# Patient Record
Sex: FEMALE | Race: WHITE | URBAN - METROPOLITAN AREA
[De-identification: names, ages, dates, MRNs, and addresses within clinical notes are randomized per-mention and may not be internally consistent; named-entity substitution may affect disease eponyms.]

---

## 2022-04-20 ENCOUNTER — HOSPITAL ENCOUNTER (OUTPATIENT)
Dept: PHYSICAL THERAPY | Age: 62
Setting detail: THERAPIES SERIES
Discharge: HOME | End: 2022-04-20
Payer: COMMERCIAL

## 2022-04-20 PROCEDURE — 97140 MANUAL THERAPY 1/> REGIONS: CPT

## 2022-04-20 PROCEDURE — 97161 PT EVAL LOW COMPLEX 20 MIN: CPT

## 2022-04-20 NOTE — FLOWSHEET NOTE
[facility]  [address]  Phone: [phone] Fax: [phone]    Physical Therapy Treatment Note/ Progress Report:     Date:  2022    Patient Name:  Diann Contreras    :  1960  MRN: [id number]  Restrictions/Precautions:    Medical/Treatment Diagnosis Information:  · Diagnosis: ,cervical stenosis, neck pain  · Treatment Diagnosis: cervical stenosis M48.02, neck pain I14.5  Insurance/Certification information:  PT Insurance Information: [place]  Physician Information:  Referring Practitioner: Dr Tonja Clifford of care signed (Y/N):     Date of Patient follow up with Physician:      Progress Report: [x]  Yes  []  No     Date Range for reporting period:  Beginnin2022  Ending:     Progress report due (10 Rx/or 30 days whichever is less):      Recertification due (POC duration/ or 90 days whichever is less): 2022     Visit # Insurance Allowable Auth Needed   1 [place] () []Yes    [x]No     Pain level:  6/10     SUBJECTIVE:  See eval    OBJECTIVE: See eval   Observation:    Test measurements:      RESTRICTIONS/PRECAUTIONS: ACDF C5/6 2022    Exercises/Interventions:   Therapeutic Ex (87481)  Min: 4 Sets/sec Reps Notes   AROM c/spine rotation 1 15    T- band Row/pinch      T- band lower pinch      T- band ER activation      UT stretch      Levator stretch      Isometric at wall      Quadruped w cerv retract      Front plank      Side plank      Chin tuck      Chin tuck w lift      Chin tuck w rotation                  Manual Intervention (34359)  Min: 17      Cerv mobs/manip      Thoracic mobs/manip 1 5    CT manip      Rib mobilizations 1 2    STM 1 10    DN            NMR re-education (34075)  Min:      T-spine Ext- foam roll      Chin tucks       No money      Wall Postural re-ed            Therapeutic Activity (58080)  Min:                                        Therapeutic Exercise and NMR EXR  [x] (24004) Provided verbal/tactile cueing for activities related to strengthening, flexibility, endurance, ROM  for improvements in cervical, postural, scapular, scapulothoracic and UE control with self care, reaching, carrying, lifting, house/yardwork, driving/computer work. [x] (15656) Provided verbal/tactile cueing for activities related to improving balance, coordination, kinesthetic sense, posture, motor skill, proprioception  to assist with cervical, scapular, scapulothoracic and UE control with self care, reaching, carrying, lifting, house/yardwork, driving/computer work. Therapeutic Activities:    [] (49425 or 57487) Provided verbal/tactile cueing for activities related to improving balance, coordination, kinesthetic sense, posture, motor skill, proprioception and motor activation to allow for proper function of cervical, scapular, scapulothoracic and UE control with self care, carrying, lifting, driving/computer work.      Home Exercise Program:    [x] (87788) Reviewed/Progressed HEP activities related to strengthening, flexibility, endurance, ROM of cervical, scapular, scapulothoracic and UE control with self care, reaching, carrying, lifting, house/yardwork, driving/computer work  [] (91082) Reviewed/Progressed HEP activities related to improving balance, coordination, kinesthetic sense, posture, motor skill, proprioception of cervical, scapular, scapulothoracic and UE control with self care, reaching, carrying, lifting, house/yardwork, driving/computer work      Manual Treatments:  PROM / STM / Oscillations-Mobs:  G-I, II, III, IV (PA's, Inf., Post.)  [x] (19741) Provided manual therapy to mobilize soft tissue/joints of cervical/CT, scapular GHJ and UE for the purpose of decreasing headache, modulating pain, promoting relaxation,  increasing ROM, reducing/eliminating soft tissue swelling/inflammation/restriction, improving soft tissue extensibility and allowing for proper ROM for normal function with self care, reaching, carrying, lifting, house/yardwork, driving/computer work    Modalities:      Charges:  Timed Code Treatment Minutes: 22   Total Treatment Minutes: 45     [x] EVAL (LOW) 36015 (typically 20 minutes face-to-face)  [] EVAL (MOD) 35497 (typically 30 minutes face-to-face)  [] EVAL (HIGH) 76247 (typically 45 minutes face-to-face)  [] RE-EVAL     [] XN(13508) x     [] Dry needle 1 or 2 Muscles (60977)  [] NMR (08576) x     [] Dry needle 3+ Muscles (95051)  [x] Manual (56219) x   1    [] TA (26354) x     [] Mech Traction (67325)  [] ES(attended) (65464)     [] ES (un) (81269):   [] VASO (00779)  [] Other:    If BWC Please Indicate Time In/Out  CPT Code Time in Time out                                     GOALS:  Patient stated goal: improve AROM to allow safe driving  [] Progressing: [] Met: [] Not Met: [] Adjusted    Therapist goals for Patient:   Short Term Goals: To be achieved in: 2 weeks  1. Independent in HEP and progression per patient tolerance, in order to prevent re-injury. [] Progressing: [] Met: [] Not Met: [] Adjusted  2. Patient will have a decrease in pain to facilitate improvement in movement, function, and ADLs as indicated by Functional Deficits. [] Progressing: [] Met: [] Not Met: [] Adjusted    Long Term Goals: To be achieved in: 4 weeks  1. Disability index score of 42 or better with Foto FS and less than 40% disability for NDI to assist with reaching prior level of function. [] Progressing: [] Met: [] Not Met: [] Adjusted  2. Patient will demonstrate increased AROM to [facility] of cervical/thoracic spine to allow for proper joint functioning as indicated by patients Functional Deficits. [] Progressing: [] Met: [] Not Met: [] Adjusted  3. Patient will demonstrate an increase in postural awareness and control and activation of  Deep cervical stabilizers to allow for proper functional mobility as indicated by patients Functional Deficits.    [] Progressing: [] Met: [] Not Met: [] Adjusted  4. Patient will return to lifting/reaching functional activities without increased symptoms or restriction. [] Progressing: [] Met: [] Not Met: [] Adjusted  5. Patient will report being able to sleep at least 6 hours and drive without increased symptoms or restriction. (patient specific functional goal)    [] Progressing: [] Met: [] Not Met: [] Adjusted    ASSESSMENT:  See eval    Treatment/Activity Tolerance:  [x] Patient tolerated treatment well [] Patient limited by fatique  [] Patient limited by pain  [] Patient limited by other medical complications  [] Other:     Overall Progression Towards Functional goals/ Treatment Progress Update:  [] Patient is progressing as expected towards functional goals listed. [] Progression is slowed due to complexities/Impairments listed. [] Progression has been slowed due to co-morbidities. [x] Plan just implemented, too soon to assess goals progression <30days   [] Goals require adjustment due to lack of progress  [] Patient is not progressing as expected and requires additional follow up with physician  [] Other    Prognosis for POC: [x] Good [] Fair  [] Poor    Patient requires continued skilled intervention: [x] Yes  [] No        PLAN: See eval  [] Continue per plan of care [] Alter current plan (see comments)  [x] Plan of care initiated [] Hold pending MD visit [] Discharge    Electronically signed by: Denilson Moreno PT    Note: If patient does not return for scheduled/recommended follow up visits, this note will serve as a discharge from care along with the most recent update on progress.
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
## 2022-04-22 ENCOUNTER — HOSPITAL ENCOUNTER (OUTPATIENT)
Dept: PHYSICAL THERAPY | Age: 62
Setting detail: THERAPIES SERIES
Discharge: HOME | End: 2022-04-22
Payer: COMMERCIAL

## 2022-04-22 PROCEDURE — 97140 MANUAL THERAPY 1/> REGIONS: CPT

## 2022-04-22 PROCEDURE — 97110 THERAPEUTIC EXERCISES: CPT

## 2022-04-22 NOTE — FLOWSHEET NOTE
[place] [address] [address]  Phone: [phone] Fax: [phone]    Physical Therapy Treatment Note/ Progress Report:     Date:  2022    Patient Name:  Licha Guevara    :  1960  MRN: [id number]  Restrictions/Precautions:    Medical/Treatment Diagnosis Information:  · Diagnosis: ,cervical stenosis, neck pain  · Treatment Diagnosis: cervical stenosis M48.02, neck pain M37.3  Insurance/Certification information:  PT Insurance Information: [place]  Physician Information:  Referring Practitioner: Dr Hellen Reinoso of care signed (Y/N):     Date of Patient follow up with Physician:      Progress Report: [x]  Yes  []  No     Date Range for reporting period:  Beginnin2022  Ending:     Progress report due (10 Rx/or 30 days whichever is less):      Recertification due (POC duration/ or 90 days whichever is less): 2022     Visit # Insurance Allowable Auth Needed   2 [place] () []Yes    [x]No     Pain level:  4/10     SUBJECTIVE:  Patient reports that she didn't get much sleep at all last night, feels very stiff this morning. Tries all manner of positions, ice/heat, etc with no relief.      OBJECTIVE:    Observation:    Test measurements:      RESTRICTIONS/PRECAUTIONS: ACDF C5/6 2022    Exercises/Interventions:   Therapeutic Ex (84978)  Min: 10 Sets/sec Reps Notes   AROM c/spine rotation 1 15    SL thoracic rotation stretch 10 10 each   T- band Row/pinch      T- band lower pinch      T- band ER activation      UT stretch      Levator stretch      Isometric at wall      Quadruped w cerv retract      Front plank      Side plank      Chin tuck 5 10 supine   Chin tuck w lift      Chin tuck w rotation                  Manual Intervention (34437)  Min: 30      Cerv ROM 1 5    Thoracic mobs/manip 1 5    CT manip      Rib mobilizations 1 5    STM 1 15 Cervical paraspinals, UT   DN            NMR re-education (19600)  Min:      T-spine Ext- foam roll      Chin tucks       No money      Wall Postural re-ed            Therapeutic Activity (88277)  Min:                                        Therapeutic Exercise and NMR EXR  [x] (31302) Provided verbal/tactile cueing for activities related to strengthening, flexibility, endurance, ROM  for improvements in cervical, postural, scapular, scapulothoracic and UE control with self care, reaching, carrying, lifting, house/yardwork, driving/computer work. [x] (15303) Provided verbal/tactile cueing for activities related to improving balance, coordination, kinesthetic sense, posture, motor skill, proprioception  to assist with cervical, scapular, scapulothoracic and UE control with self care, reaching, carrying, lifting, house/yardwork, driving/computer work. Therapeutic Activities:    [] (88827 or 27937) Provided verbal/tactile cueing for activities related to improving balance, coordination, kinesthetic sense, posture, motor skill, proprioception and motor activation to allow for proper function of cervical, scapular, scapulothoracic and UE control with self care, carrying, lifting, driving/computer work.      Home Exercise Program:    [x] (91734) Reviewed/Progressed HEP activities related to strengthening, flexibility, endurance, ROM of cervical, scapular, scapulothoracic and UE control with self care, reaching, carrying, lifting, house/yardwork, driving/computer work  [] (05329) Reviewed/Progressed HEP activities related to improving balance, coordination, kinesthetic sense, posture, motor skill, proprioception of cervical, scapular, scapulothoracic and UE control with self care, reaching, carrying, lifting, house/yardwork, driving/computer work      Manual Treatments:  PROM / STM / Oscillations-Mobs:  G-I, II, III, IV (PA's, Inf., Post.)  [x] (08273) Provided manual therapy to mobilize soft tissue/joints of cervical/CT, scapular GHJ and UE for the purpose of decreasing headache, modulating pain, promoting relaxation,  increasing ROM, reducing/eliminating soft tissue swelling/inflammation/restriction, improving soft tissue extensibility and allowing for proper ROM for normal function with self care, reaching, carrying, lifting, house/yardwork, driving/computer work    Modalities:      Charges:  Timed Code Treatment Minutes: 40   Total Treatment Minutes: 40     [] EVAL (LOW) 55354 (typically 20 minutes face-to-face)  [] EVAL (MOD) 24116 (typically 30 minutes face-to-face)  [] EVAL (HIGH) 95716 (typically 45 minutes face-to-face)  [] RE-EVAL     [x] WV(51102) x 1    [] Dry needle 1 or 2 Muscles (60636)  [] NMR (09089) x     [] Dry needle 3+ Muscles (45670)  [x] Manual (84455) x   2    [] TA (30202) x     [] Mech Traction (49831)  [] ES(attended) (04442)     [] ES (un) (12528):   [] VASO (06594)  [] Other:    If [facility] Please Indicate Time In/Out  CPT Code Time in Time out                                     GOALS:  Patient stated goal: improve AROM to allow safe driving  [] Progressing: [] Met: [] Not Met: [] Adjusted    Therapist goals for Patient:   Short Term Goals: To be achieved in: 2 weeks  1. Independent in HEP and progression per patient tolerance, in order to prevent re-injury. [] Progressing: [] Met: [] Not Met: [] Adjusted  2. Patient will have a decrease in pain to facilitate improvement in movement, function, and ADLs as indicated by Functional Deficits. [] Progressing: [] Met: [] Not Met: [] Adjusted    Long Term Goals: To be achieved in: 4 weeks  1. Disability index score of 42 or better with Foto FS and less than 40% disability for NDI to assist with reaching prior level of function. [] Progressing: [] Met: [] Not Met: [] Adjusted  2. Patient will demonstrate increased AROM to [facility] of cervical/thoracic spine to allow for proper joint functioning as indicated by patients Functional Deficits. [] Progressing: [] Met: [] Not Met: [] Adjusted  3.  Patient will demonstrate an increase in postural awareness and control and activation of  Deep cervical stabilizers to allow for proper functional mobility as indicated by patients Functional Deficits. [] Progressing: [] Met: [] Not Met: [] Adjusted  4. Patient will return to lifting/reaching functional activities without increased symptoms or restriction. [] Progressing: [] Met: [] Not Met: [] Adjusted  5. Patient will report being able to sleep at least 6 hours and drive without increased symptoms or restriction. (patient specific functional goal)    [] Progressing: [] Met: [] Not Met: [] Adjusted    ASSESSMENT:  Improvement in stiffness at conclusion with patient able to achieve greater cervical rotation AROM at conclusion, though still limited bilaterally. Patient extremely guarded through C-spine with significant myofascial restriction throughout. Added side lying thoracic rotation stretch to HEP. Treatment/Activity Tolerance:  [x] Patient tolerated treatment well [] Patient limited by fatique  [] Patient limited by pain  [] Patient limited by other medical complications  [] Other:     Overall Progression Towards Functional goals/ Treatment Progress Update:  [] Patient is progressing as expected towards functional goals listed. [] Progression is slowed due to complexities/Impairments listed. [] Progression has been slowed due to co-morbidities.   [x] Plan just implemented, too soon to assess goals progression <30days   [] Goals require adjustment due to lack of progress  [] Patient is not progressing as expected and requires additional follow up with physician  [] Other    Prognosis for POC: [x] Good [] Fair  [] Poor    Patient requires continued skilled intervention: [x] Yes  [] No        PLAN:   [x] Continue per plan of care [] Alter current plan (see comments)  [] Plan of care initiated [] Hold pending MD visit [] Discharge    Electronically signed by: Dao Duran PT    Note: If patient does not return for scheduled/recommended follow up visits, this note will serve as a discharge from care along with the most recent update on progress.

## 2022-04-27 ENCOUNTER — HOSPITAL ENCOUNTER (OUTPATIENT)
Dept: PHYSICAL THERAPY | Age: 62
Setting detail: THERAPIES SERIES
Discharge: HOME | End: 2022-04-27
Payer: COMMERCIAL

## 2022-04-27 PROCEDURE — 97110 THERAPEUTIC EXERCISES: CPT

## 2022-04-27 PROCEDURE — 97140 MANUAL THERAPY 1/> REGIONS: CPT

## 2022-04-27 NOTE — FLOWSHEET NOTE
[facility] [address] [facility]  Phone: [phone] Fax: [phone]    Physical Therapy Treatment Note/ Progress Report:     Date:  2022    Patient Name:  Magalys Schulz    :  1960  MRN: [id number]  Restrictions/Precautions:    Medical/Treatment Diagnosis Information:  · Diagnosis: ,cervical stenosis, neck pain  · Treatment Diagnosis: cervical stenosis M48.02, neck pain V17.9  Insurance/Certification information:  PT Insurance Information: [place]  Physician Information:  Referring Practitioner: Dr Lauren Raman of care signed (Y/N):     Date of Patient follow up with Physician:      Progress Report: [x]  Yes  []  No     Date Range for reporting period:  Beginnin2022  Ending:     Progress report due (10 Rx/or 30 days whichever is less):      Recertification due (POC duration/ or 90 days whichever is less): 2022     Visit # Insurance Allowable Auth Needed   3 [place] (yr) []Yes    [x]No     Pain level:  4/10     SUBJECTIVE:  Patient reports that she feels like motion continues to improve, but still limited.     OBJECTIVE:    Observation:    Test measurements:      RESTRICTIONS/PRECAUTIONS: ACDF C5/6 2022    Exercises/Interventions:   Therapeutic Ex (58434)  Min: 25 Sets/sec Reps Notes   AROM c/spine rotation 1 15    SL thoracic rotation stretch 10 10 each   T- band Row/pinch 1 10 green   T- band lower pinch 1 10 green   T- band ER activation      UT stretch      Levator stretch      Cervical spine isometrics  10 10 Rotation, SB, flex, ext   Quadruped w cerv retract      Front plank      Side plank      Chin tuck 5 10 supine   Chin tuck w lift      Chin tuck w rotation ball on wall 1 10    Standing shoulder elevation to 90 degrees, flexion, scaption and abd 1 15 1 lb         Manual Intervention (85992)  Min: 20      Cerv ROM 1 5    Thoracic mobs/manip 1 0    CT manip      Rib mobilizations 1 5    STM 1 10 Cervical paraspinals, UT   DN            NMR re-education (84408)  Min:      T-spine Ext- foam roll      Chin tucks       No money      Wall Postural re-ed            Therapeutic Activity (30728)  Min:                                        Therapeutic Exercise and NMR EXR  [x] (88285) Provided verbal/tactile cueing for activities related to strengthening, flexibility, endurance, ROM  for improvements in cervical, postural, scapular, scapulothoracic and UE control with self care, reaching, carrying, lifting, house/yardwork, driving/computer work. [x] (21494) Provided verbal/tactile cueing for activities related to improving balance, coordination, kinesthetic sense, posture, motor skill, proprioception  to assist with cervical, scapular, scapulothoracic and UE control with self care, reaching, carrying, lifting, house/yardwork, driving/computer work. Therapeutic Activities:    [] (10852 or 93325) Provided verbal/tactile cueing for activities related to improving balance, coordination, kinesthetic sense, posture, motor skill, proprioception and motor activation to allow for proper function of cervical, scapular, scapulothoracic and UE control with self care, carrying, lifting, driving/computer work.      Home Exercise Program:    [x] (02062) Reviewed/Progressed HEP activities related to strengthening, flexibility, endurance, ROM of cervical, scapular, scapulothoracic and UE control with self care, reaching, carrying, lifting, house/yardwork, driving/computer work  [] (77694) Reviewed/Progressed HEP activities related to improving balance, coordination, kinesthetic sense, posture, motor skill, proprioception of cervical, scapular, scapulothoracic and UE control with self care, reaching, carrying, lifting, house/yardwork, driving/computer work      Manual Treatments:  PROM / STM / Oscillations-Mobs:  G-I, II, III, IV (PA's, Inf., Post.)  [x] (44599) Provided manual therapy to mobilize soft tissue/joints of cervical/CT, scapular GHJ and UE for the purpose of decreasing headache, modulating pain, promoting relaxation,  increasing ROM, reducing/eliminating soft tissue swelling/inflammation/restriction, improving soft tissue extensibility and allowing for proper ROM for normal function with self care, reaching, carrying, lifting, house/yardwork, driving/computer work    Modalities:      Charges:  Timed Code Treatment Minutes: 45   Total Treatment Minutes: 45     [] EVAL (LOW) 63308 (typically 20 minutes face-to-face)  [] EVAL (MOD) 59716 (typically 30 minutes face-to-face)  [] EVAL (HIGH) 22411 (typically 45 minutes face-to-face)  [] RE-EVAL     [x] JZ(59547) x 2   [] Dry needle 1 or 2 Muscles (79862)  [] NMR (57713) x     [] Dry needle 3+ Muscles (94549)  [x] Manual (93425) x   1    [] TA (79126) x     [] Mech Traction (05588)  [] ES(attended) (01327)     [] ES (un) (89449):   [] VASO (17934)  [] Other:    If [facility] Please Indicate Time In/Out  CPT Code Time in Time out                                     GOALS:  Patient stated goal: improve AROM to allow safe driving  [] Progressing: [] Met: [] Not Met: [] Adjusted    Therapist goals for Patient:   Short Term Goals: To be achieved in: 2 weeks  1. Independent in HEP and progression per patient tolerance, in order to prevent re-injury. [] Progressing: [] Met: [] Not Met: [] Adjusted  2. Patient will have a decrease in pain to facilitate improvement in movement, function, and ADLs as indicated by Functional Deficits. [] Progressing: [] Met: [] Not Met: [] Adjusted    Long Term Goals: To be achieved in: 4 weeks  1. Disability index score of 42 or better with Foto FS and less than 40% disability for NDI to assist with reaching prior level of function. [] Progressing: [] Met: [] Not Met: [] Adjusted  2. Patient will demonstrate increased AROM to [facility] of cervical/thoracic spine to allow for proper joint functioning as indicated by patients Functional Deficits.   [] Progressing: [] Met: [] Not Met: [] Adjusted  3. Patient will demonstrate an increase in postural awareness and control and activation of  Deep cervical stabilizers to allow for proper functional mobility as indicated by patients Functional Deficits. [] Progressing: [] Met: [] Not Met: [] Adjusted  4. Patient will return to lifting/reaching functional activities without increased symptoms or restriction. [] Progressing: [] Met: [] Not Met: [] Adjusted  5. Patient will report being able to sleep at least 6 hours and drive without increased symptoms or restriction. (patient specific functional goal)    [] Progressing: [] Met: [] Not Met: [] Adjusted    ASSESSMENT:  Improvement in stiffness at conclusion with patient able to achieve greater cervical rotation and flexion AROM at conclusion, though still limited bilaterally. Patient extremely guarded through C-spine with significant myofascial restriction throughout. Progressed isometrics for c/spine and periscapular/shoulder strengthening. Treatment/Activity Tolerance:  [x] Patient tolerated treatment well [] Patient limited by fatique  [] Patient limited by pain  [] Patient limited by other medical complications  [] Other:     Overall Progression Towards Functional goals/ Treatment Progress Update:  [] Patient is progressing as expected towards functional goals listed. [] Progression is slowed due to complexities/Impairments listed. [] Progression has been slowed due to co-morbidities.   [x] Plan just implemented, too soon to assess goals progression <30days   [] Goals require adjustment due to lack of progress  [] Patient is not progressing as expected and requires additional follow up with physician  [] Other    Prognosis for POC: [x] Good [] Fair  [] Poor    Patient requires continued skilled intervention: [x] Yes  [] No        PLAN:   [x] Continue per plan of care [] Alter current plan (see comments)  [] Plan of care initiated [] Hold pending MD visit [] Discharge    Electronically signed by: Antoine Bender PT    Note: If patient does not return for scheduled/recommended follow up visits, this note will serve as a discharge from care along with the most recent update on progress.

## 2022-04-29 ENCOUNTER — HOSPITAL ENCOUNTER (OUTPATIENT)
Dept: PHYSICAL THERAPY | Age: 62
Setting detail: THERAPIES SERIES
Discharge: HOME | End: 2022-04-29
Payer: COMMERCIAL

## 2022-04-29 PROCEDURE — 97110 THERAPEUTIC EXERCISES: CPT

## 2022-04-29 PROCEDURE — 97140 MANUAL THERAPY 1/> REGIONS: CPT

## 2022-04-29 NOTE — FLOWSHEET NOTE
[facility] [address] [address]  Phone: [phone] Fax: [phone]    Physical Therapy Treatment Note/ Progress Report:     Date:  2022    Patient Name:  Jon Armstrong    :  1960  MRN: [id number]  Restrictions/Precautions:    Medical/Treatment Diagnosis Information:  · Diagnosis: ,cervical stenosis, neck pain  · Treatment Diagnosis: cervical stenosis M48.02, neck pain X37.2  Insurance/Certification information:  PT Insurance Information: [place]  Physician Information:  Referring Practitioner: Dr Nalini Prieto of care signed (Y/N):     Date of Patient follow up with Physician:      Progress Report: [x]  Yes  []  No     Date Range for reporting period:  Beginnin2022  Ending:     Progress report due (10 Rx/or 30 days whichever is less):      Recertification due (POC duration/ or 90 days whichever is less): 2022     Visit # Insurance Allowable Auth Needed   4 [place] () []Yes    [x]No     Pain level:  4/10     SUBJECTIVE:  Patient reports that she feels like motion continues to improve, but still limited.     OBJECTIVE:    Observation:    Test measurements:      RESTRICTIONS/PRECAUTIONS: ACDF C5/6 2022    Exercises/Interventions:   Therapeutic Ex (03360)  Min: 25 Sets/sec Reps Notes   AROM c/spine rotation 1 15    SL thoracic rotation stretch 10 10 each   T- band Row/pinch 1 10 green   T- band lower pinch 1 10 green   T-spine ext over roll 10 10    No money 1 15 green         Cervical spine stepping isometrics with red band 10 10 Retro stepping and lateral stepping bilat   Quadruped w cerv retract      Front plank      Side plank      Chin tuck 5 10 supine   Chin tuck w lift      Chin tuck w rotation ball on wall 1 10    Standing shoulder elevation to 90 degrees, flexion, scaption and abd 1 15 1 lb         Manual Intervention (47629)  Min: 20      Cerv ROM 1 5    Thoracic mobs/manip 1 0    CT manip      Rib mobilizations 1 5    STM 1 10 Cervical paraspinals, UT   DN            NMR re-education (83246)  Min:      T-spine Ext- foam roll      Chin tucks       No money      Wall Postural re-ed            Therapeutic Activity (90300)  Min:                                        Therapeutic Exercise and NMR EXR  [x] (51151) Provided verbal/tactile cueing for activities related to strengthening, flexibility, endurance, ROM  for improvements in cervical, postural, scapular, scapulothoracic and UE control with self care, reaching, carrying, lifting, house/yardwork, driving/computer work. [x] (00579) Provided verbal/tactile cueing for activities related to improving balance, coordination, kinesthetic sense, posture, motor skill, proprioception  to assist with cervical, scapular, scapulothoracic and UE control with self care, reaching, carrying, lifting, house/yardwork, driving/computer work. Therapeutic Activities:    [] (97146 or 76765) Provided verbal/tactile cueing for activities related to improving balance, coordination, kinesthetic sense, posture, motor skill, proprioception and motor activation to allow for proper function of cervical, scapular, scapulothoracic and UE control with self care, carrying, lifting, driving/computer work.      Home Exercise Program:    [x] (42620) Reviewed/Progressed HEP activities related to strengthening, flexibility, endurance, ROM of cervical, scapular, scapulothoracic and UE control with self care, reaching, carrying, lifting, house/yardwork, driving/computer work  [] (05454) Reviewed/Progressed HEP activities related to improving balance, coordination, kinesthetic sense, posture, motor skill, proprioception of cervical, scapular, scapulothoracic and UE control with self care, reaching, carrying, lifting, house/yardwork, driving/computer work      Manual Treatments:  PROM / STM / Oscillations-Mobs:  G-I, II, III, IV (PA's, Inf., Post.)  [x] (08281) Provided manual therapy to mobilize soft tissue/joints of cervical/CT, scapular GHJ and UE for the purpose of decreasing headache, modulating pain, promoting relaxation,  increasing ROM, reducing/eliminating soft tissue swelling/inflammation/restriction, improving soft tissue extensibility and allowing for proper ROM for normal function with self care, reaching, carrying, lifting, house/yardwork, driving/computer work    Modalities:      Charges:  Timed Code Treatment Minutes: 45   Total Treatment Minutes: 45     [] EVAL (LOW) 00623 (typically 20 minutes face-to-face)  [] EVAL (MOD) 27638 (typically 30 minutes face-to-face)  [] EVAL (HIGH) 84355 (typically 45 minutes face-to-face)  [] RE-EVAL     [x] RG(57326) x 2   [] Dry needle 1 or 2 Muscles (11136)  [] NMR (79472) x     [] Dry needle 3+ Muscles (19796)  [x] Manual (66050) x   1    [] TA (51734) x     [] Mech Traction (39443)  [] ES(attended) (60119)     [] ES (un) (13451):   [] VASO (96248)  [] Other:    If [facility] Please Indicate Time In/Out  CPT Code Time in Time out                                     GOALS:  Patient stated goal: improve AROM to allow safe driving  [] Progressing: [] Met: [] Not Met: [] Adjusted    Therapist goals for Patient:   Short Term Goals: To be achieved in: 2 weeks  1. Independent in HEP and progression per patient tolerance, in order to prevent re-injury. [] Progressing: [] Met: [] Not Met: [] Adjusted  2. Patient will have a decrease in pain to facilitate improvement in movement, function, and ADLs as indicated by Functional Deficits. [] Progressing: [] Met: [] Not Met: [] Adjusted    Long Term Goals: To be achieved in: 4 weeks  1. Disability index score of 42 or better with Foto FS and less than 40% disability for NDI to assist with reaching prior level of function. [] Progressing: [] Met: [] Not Met: [] Adjusted  2.  Patient will demonstrate increased AROM to [facility] of cervical/thoracic spine to allow for proper joint functioning as indicated by patients Functional Deficits. [] Progressing: [] Met: [] Not Met: [] Adjusted  3. Patient will demonstrate an increase in postural awareness and control and activation of  Deep cervical stabilizers to allow for proper functional mobility as indicated by patients Functional Deficits. [] Progressing: [] Met: [] Not Met: [] Adjusted  4. Patient will return to lifting/reaching functional activities without increased symptoms or restriction. [] Progressing: [] Met: [] Not Met: [] Adjusted  5. Patient will report being able to sleep at least 6 hours and drive without increased symptoms or restriction. (patient specific functional goal)    [] Progressing: [] Met: [] Not Met: [] Adjusted    ASSESSMENT:  Improvement in stiffness at conclusion with patient able to achieve greater cervical rotation and flexion AROM at conclusion, though still limited bilaterally. Patient extremely guarded through C-spine with significant myofascial restriction throughout. Progressed program for t/spine and c/spine , as well as shoulder strengthening. Treatment/Activity Tolerance:  [x] Patient tolerated treatment well [] Patient limited by fatique  [] Patient limited by pain  [] Patient limited by other medical complications  [] Other:     Overall Progression Towards Functional goals/ Treatment Progress Update:  [] Patient is progressing as expected towards functional goals listed. [] Progression is slowed due to complexities/Impairments listed. [] Progression has been slowed due to co-morbidities.   [x] Plan just implemented, too soon to assess goals progression <30days   [] Goals require adjustment due to lack of progress  [] Patient is not progressing as expected and requires additional follow up with physician  [] Other    Prognosis for POC: [x] Good [] Fair  [] Poor    Patient requires continued skilled intervention: [x] Yes  [] No        PLAN:   [x] Continue per plan of care [] Alter current plan (see comments)  [] Plan of care initiated [] Hold pending MD visit [] Discharge    Electronically signed by: Brooks Mathew PT    Note: If patient does not return for scheduled/recommended follow up visits, this note will serve as a discharge from care along with the most recent update on progress.

## 2022-05-02 ENCOUNTER — HOSPITAL ENCOUNTER (OUTPATIENT)
Dept: PHYSICAL THERAPY | Age: 62
Setting detail: THERAPIES SERIES
Discharge: HOME | End: 2022-05-02
Payer: COMMERCIAL

## 2022-05-02 PROCEDURE — 97140 MANUAL THERAPY 1/> REGIONS: CPT

## 2022-05-02 PROCEDURE — 97110 THERAPEUTIC EXERCISES: CPT

## 2022-05-02 NOTE — FLOWSHEET NOTE
[place] [address] [facility]  Phone: [phone] Fax: [phone]    Physical Therapy Treatment Note/ Progress Report:     Date:  2022    Patient Name:  Rebeca Villarreal    :  1960  MRN: [id number]  Restrictions/Precautions:    Medical/Treatment Diagnosis Information:  · Diagnosis: ,cervical stenosis, neck pain  · Treatment Diagnosis: cervical stenosis M48.02, neck pain Y44.2  Insurance/Certification information:  PT Insurance Information: [place]  Physician Information:  Referring Practitioner: Dr Toscano [facility] of care signed (Y/N):     Date of Patient follow up with Physician:      Progress Report: [x]  Yes  []  No     Date Range for reporting period:  Beginnin2022  Ending:     Progress report due (10 Rx/or 30 days whichever is less):      Recertification due (POC duration/ or 90 days whichever is less): 2022     Visit # Insurance Allowable Auth Needed   5 [place] () []Yes    [x]No     Pain level:  4/10     SUBJECTIVE:  Patient reports that she feels like motion continues to improve, but still limited. Sleeping much better, got about 5 hours of sleep last night.      OBJECTIVE:    Observation:    Test measurements:      RESTRICTIONS/PRECAUTIONS: ACDF C5/6 2022    Exercises/Interventions:   Therapeutic Ex (68136)  Min: 25 Sets/sec Reps Notes   AROM c/spine rotation 1 15    Half kneel thoracic rotation stretch 10 10 each   T- band Row/pinch 1 10 green   T- band lower pinch 1 10 green   T-spine ext over roll 10 10    No money 1 15 green   Cervical retraction 10 10    Cervical spine stepping isometrics with red band 10 10 Retro stepping and lateral stepping bilat   Quadruped w cerv retract      Front plank      Side plank      Chin tuck 5 10 supine   Chin tuck w lift      Chin tuck w rotation ball on wall 1 10    Standing shoulder elevation to 90 degrees, flexion, scaption and abd 1 15 1 lb Manual Intervention (86945)  Min: 20      Cerv ROM 1 5    Thoracic mobs/manip 1 0    CT manip      Rib mobilizations 1 5    STM 1 10 Cervical paraspinals, UT   DN            NMR re-education (69831)  Min:      T-spine Ext- foam roll      Chin tucks       No money      Wall Postural re-ed            Therapeutic Activity (01609)  Min:                                        Therapeutic Exercise and NMR EXR  [x] (15301) Provided verbal/tactile cueing for activities related to strengthening, flexibility, endurance, ROM  for improvements in cervical, postural, scapular, scapulothoracic and UE control with self care, reaching, carrying, lifting, house/yardwork, driving/computer work. [x] (10216) Provided verbal/tactile cueing for activities related to improving balance, coordination, kinesthetic sense, posture, motor skill, proprioception  to assist with cervical, scapular, scapulothoracic and UE control with self care, reaching, carrying, lifting, house/yardwork, driving/computer work. Therapeutic Activities:    [] (44814 or 70321) Provided verbal/tactile cueing for activities related to improving balance, coordination, kinesthetic sense, posture, motor skill, proprioception and motor activation to allow for proper function of cervical, scapular, scapulothoracic and UE control with self care, carrying, lifting, driving/computer work.      Home Exercise Program:    [x] (49835) Reviewed/Progressed HEP activities related to strengthening, flexibility, endurance, ROM of cervical, scapular, scapulothoracic and UE control with self care, reaching, carrying, lifting, house/yardwork, driving/computer work  [] (03822) Reviewed/Progressed HEP activities related to improving balance, coordination, kinesthetic sense, posture, motor skill, proprioception of cervical, scapular, scapulothoracic and UE control with self care, reaching, carrying, lifting, house/yardwork, driving/computer work      Manual Treatments:  PROM / STM / Oscillations-Mobs:  G-I, II, III, IV (PA's, Inf., Post.)  [x] (67676) Provided manual therapy to mobilize soft tissue/joints of cervical/CT, scapular GHJ and UE for the purpose of decreasing headache, modulating pain, promoting relaxation,  increasing ROM, reducing/eliminating soft tissue swelling/inflammation/restriction, improving soft tissue extensibility and allowing for proper ROM for normal function with self care, reaching, carrying, lifting, house/yardwork, driving/computer work    Modalities:      Charges:  Timed Code Treatment Minutes: 45   Total Treatment Minutes: 45     [] EVAL (LOW) 08436 (typically 20 minutes face-to-face)  [] EVAL (MOD) 13202 (typically 30 minutes face-to-face)  [] EVAL (HIGH) 26975 (typically 45 minutes face-to-face)  [] RE-EVAL     [x] CC(46508) x 2   [] Dry needle 1 or 2 Muscles (56778)  [] NMR (95997) x     [] Dry needle 3+ Muscles (22431)  [x] Manual (88709) x   1    [] TA (30653) x     [] Mech Traction (65350)  [] ES(attended) (66267)     [] ES (un) (06461):   [] VASO (35436)  [] Other:    If [facility] Please Indicate Time In/Out  CPT Code Time in Time out                                     GOALS:  Patient stated goal: improve AROM to allow safe driving  [] Progressing: [] Met: [] Not Met: [] Adjusted    Therapist goals for Patient:   Short Term Goals: To be achieved in: 2 weeks  1. Independent in HEP and progression per patient tolerance, in order to prevent re-injury. [] Progressing: [] Met: [] Not Met: [] Adjusted  2. Patient will have a decrease in pain to facilitate improvement in movement, function, and ADLs as indicated by Functional Deficits. [] Progressing: [] Met: [] Not Met: [] Adjusted    Long Term Goals: To be achieved in: 4 weeks  1. Disability index score of 42 or better with Foto FS and less than 40% disability for NDI to assist with reaching prior level of function. [] Progressing: [] Met: [] Not Met: [] Adjusted  2.  Patient will demonstrate increased AROM to WFL of cervical/thoracic spine to allow for proper joint functioning as indicated by patients Functional Deficits. [] Progressing: [] Met: [] Not Met: [] Adjusted  3. Patient will demonstrate an increase in postural awareness and control and activation of  Deep cervical stabilizers to allow for proper functional mobility as indicated by patients Functional Deficits. [] Progressing: [] Met: [] Not Met: [] Adjusted  4. Patient will return to lifting/reaching functional activities without increased symptoms or restriction. [] Progressing: [] Met: [] Not Met: [] Adjusted  5. Patient will report being able to sleep at least 6 hours and drive without increased symptoms or restriction. (patient specific functional goal)    [] Progressing: [] Met: [] Not Met: [] Adjusted    ASSESSMENT:  Improvement in stiffness at conclusion with patient able to achieve greater cervical rotation and flexion AROM at conclusion, though still limited bilaterally. Patient extremely guarded through C-spine with significant myofascial restriction throughout, especially noted in bilateral levator scapula. Progressed program for t/spine and c/spine , as well as shoulder strengthening. Treatment/Activity Tolerance:  [x] Patient tolerated treatment well [] Patient limited by fatique  [] Patient limited by pain  [] Patient limited by other medical complications  [] Other:     Overall Progression Towards Functional goals/ Treatment Progress Update:  [] Patient is progressing as expected towards functional goals listed. [] Progression is slowed due to complexities/Impairments listed. [] Progression has been slowed due to co-morbidities.   [x] Plan just implemented, too soon to assess goals progression <30days   [] Goals require adjustment due to lack of progress  [] Patient is not progressing as expected and requires additional follow up with physician  [] Other    Prognosis for POC: [x] Good [] Fair  [] Poor    Patient requires continued skilled intervention: [x] Yes  [] No        PLAN:   [x] Continue per plan of care [] Alter current plan (see comments)  [] Plan of care initiated [] Hold pending MD visit [] Discharge    Electronically signed by: Agustin La PT    Note: If patient does not return for scheduled/recommended follow up visits, this note will serve as a discharge from care along with the most recent update on progress.

## 2022-05-06 ENCOUNTER — HOSPITAL ENCOUNTER (OUTPATIENT)
Dept: PHYSICAL THERAPY | Age: 62
Setting detail: THERAPIES SERIES
Discharge: HOME | End: 2022-05-06
Payer: COMMERCIAL

## 2022-05-06 PROCEDURE — 97140 MANUAL THERAPY 1/> REGIONS: CPT

## 2022-05-06 PROCEDURE — 97110 THERAPEUTIC EXERCISES: CPT

## 2022-05-06 NOTE — FLOWSHEET NOTE
[place] [address] [address]  Phone: [phone] Fax: [phone]    Physical Therapy Treatment Note/ Progress Report:     Date:  2022    Patient Name:  Licha Guevara    :  1960  MRN: [id number]  Restrictions/Precautions:    Medical/Treatment Diagnosis Information:  · Diagnosis: ,cervical stenosis, neck pain  · Treatment Diagnosis: cervical stenosis M48.02, neck pain E68.2  Insurance/Certification information:  PT Insurance Information: [place]  Physician Information:  Referring Practitioner: Dr Hellen Reinoso of care signed (Y/N):     Date of Patient follow up with Physician:      Progress Report: [x]  Yes  []  No     Date Range for reporting period:  Beginnin2022  Ending:     Progress report due (10 Rx/or 30 days whichever is less):      Recertification due (POC duration/ or 90 days whichever is less): 2022     Visit # Insurance Allowable Auth Needed   6 [place] (yr) []Yes    [x]No     Pain level:  0/10     SUBJECTIVE:  Patient reports that she feels like motion continues to improve, but still limited. Was able to drive to therapy today.        OBJECTIVE:    Observation:    Test measurements:      RESTRICTIONS/PRECAUTIONS: ACDF C5/6 2022    Exercises/Interventions:   Therapeutic Ex (21162)  Min: 25 Sets/sec Reps Notes   AROM c/spine rotation 0     Half kneel thoracic rotation stretch 10 10 each   T- band Row/pinch 1 10 green   T- band lower pinch 1 10 green   T-spine ext over roll 10 10    No money 1 15 green   Cervical retraction 10 10    Cervical spine stepping isometrics with red band 10 10 Retro stepping and lateral stepping bilat   Quadruped w cerv retract      Front plank      Side plank      Chin tuck 5 10 supine   Chin tuck w lift      Chin tuck w rotation ball on wall 1 10    Standing shoulder elevation to 90 degrees, flexion, scaption and abd 1 15 2 lb         Manual Intervention (22684)  Min: 20      Cerv ROM 1 5    Thoracic mobs/manip 1 0    CT manip      Rib mobilizations 1 5    STM 1 10 Cervical paraspinals, UT   DN            NMR re-education (91712)  Min:      T-spine Ext- foam roll      Chin tucks       No money      Wall Postural re-ed            Therapeutic Activity (55880)  Min:                                        Therapeutic Exercise and NMR EXR  [x] (20862) Provided verbal/tactile cueing for activities related to strengthening, flexibility, endurance, ROM  for improvements in cervical, postural, scapular, scapulothoracic and UE control with self care, reaching, carrying, lifting, house/yardwork, driving/computer work. [x] (89643) Provided verbal/tactile cueing for activities related to improving balance, coordination, kinesthetic sense, posture, motor skill, proprioception  to assist with cervical, scapular, scapulothoracic and UE control with self care, reaching, carrying, lifting, house/yardwork, driving/computer work. Therapeutic Activities:    [] (31124 or 89384) Provided verbal/tactile cueing for activities related to improving balance, coordination, kinesthetic sense, posture, motor skill, proprioception and motor activation to allow for proper function of cervical, scapular, scapulothoracic and UE control with self care, carrying, lifting, driving/computer work.      Home Exercise Program:    [x] (53743) Reviewed/Progressed HEP activities related to strengthening, flexibility, endurance, ROM of cervical, scapular, scapulothoracic and UE control with self care, reaching, carrying, lifting, house/yardwork, driving/computer work  [] (61125) Reviewed/Progressed HEP activities related to improving balance, coordination, kinesthetic sense, posture, motor skill, proprioception of cervical, scapular, scapulothoracic and UE control with self care, reaching, carrying, lifting, house/yardwork, driving/computer work      Manual Treatments:  PROM / STM / Oscillations-Mobs: G-I, II, III, IV (Yahir, Inf., Post.)  [x] (20730) Provided manual therapy to mobilize soft tissue/joints of cervical/CT, scapular GHJ and UE for the purpose of decreasing headache, modulating pain, promoting relaxation,  increasing ROM, reducing/eliminating soft tissue swelling/inflammation/restriction, improving soft tissue extensibility and allowing for proper ROM for normal function with self care, reaching, carrying, lifting, house/yardwork, driving/computer work    Modalities:      Charges:  Timed Code Treatment Minutes: 45   Total Treatment Minutes: 45     [] EVAL (LOW) 60800 (typically 20 minutes face-to-face)  [] EVAL (MOD) 50055 (typically 30 minutes face-to-face)  [] EVAL (HIGH) 06885 (typically 45 minutes face-to-face)  [] RE-EVAL     [x] KF(43254) x 2   [] Dry needle 1 or 2 Muscles (45824)  [] NMR (23433) x     [] Dry needle 3+ Muscles (02654)  [x] Manual (81572) x   1    [] TA (35759) x     [] Mech Traction (14977)  [] ES(attended) (21638)     [] ES (un) (64419):   [] VASO (09503)  [] Other:    If [facility] Please Indicate Time In/Out  CPT Code Time in Time out                                     GOALS:  Patient stated goal: improve AROM to allow safe driving  [] Progressing: [] Met: [] Not Met: [] Adjusted    Therapist goals for Patient:   Short Term Goals: To be achieved in: 2 weeks  1. Independent in HEP and progression per patient tolerance, in order to prevent re-injury. [] Progressing: [] Met: [] Not Met: [] Adjusted  2. Patient will have a decrease in pain to facilitate improvement in movement, function, and ADLs as indicated by Functional Deficits. [] Progressing: [] Met: [] Not Met: [] Adjusted    Long Term Goals: To be achieved in: 4 weeks  1. Disability index score of 42 or better with Foto FS and less than 40% disability for NDI to assist with reaching prior level of function. [] Progressing: [] Met: [] Not Met: [] Adjusted  2.  Patient will demonstrate increased AROM to [facility] of cervical/thoracic spine to allow for proper joint functioning as indicated by patients Functional Deficits. [] Progressing: [] Met: [] Not Met: [] Adjusted  3. Patient will demonstrate an increase in postural awareness and control and activation of  Deep cervical stabilizers to allow for proper functional mobility as indicated by patients Functional Deficits. [] Progressing: [] Met: [] Not Met: [] Adjusted  4. Patient will return to lifting/reaching functional activities without increased symptoms or restriction. [] Progressing: [] Met: [] Not Met: [] Adjusted  5. Patient will report being able to sleep at least 6 hours and drive without increased symptoms or restriction. (patient specific functional goal)    [] Progressing: [] Met: [] Not Met: [] Adjusted    ASSESSMENT:  Improvement in stiffness at conclusion with patient able to achieve greater cervical rotation and flexion AROM at conclusion, though still limited bilaterally. Less guarded through C-spine and in levator scapula. Progressed program for t/spine and c/spine , as well as shoulder strengthening. Continues to need cues for decreased UT over utilization. Treatment/Activity Tolerance:  [x] Patient tolerated treatment well [] Patient limited by fatique  [] Patient limited by pain  [] Patient limited by other medical complications  [] Other:     Overall Progression Towards Functional goals/ Treatment Progress Update:  [] Patient is progressing as expected towards functional goals listed. [] Progression is slowed due to complexities/Impairments listed. [] Progression has been slowed due to co-morbidities.   [x] Plan just implemented, too soon to assess goals progression <30days   [] Goals require adjustment due to lack of progress  [] Patient is not progressing as expected and requires additional follow up with physician  [] Other    Prognosis for POC: [x] Good [] Fair  [] Poor    Patient requires continued skilled intervention: [x] Yes  [] No PLAN:   [x] Continue per plan of care [] Alter current plan (see comments)  [] Plan of care initiated [] Hold pending MD visit [] Discharge    Electronically signed by: Michael Steele PT    Note: If patient does not return for scheduled/recommended follow up visits, this note will serve as a discharge from care along with the most recent update on progress.

## 2022-05-09 ENCOUNTER — APPOINTMENT (OUTPATIENT)
Dept: PHYSICAL THERAPY | Age: 62
End: 2022-05-09
Payer: COMMERCIAL

## 2022-05-11 ENCOUNTER — HOSPITAL ENCOUNTER (OUTPATIENT)
Dept: PHYSICAL THERAPY | Age: 62
Setting detail: THERAPIES SERIES
Discharge: HOME | End: 2022-05-11
Payer: COMMERCIAL

## 2022-05-11 PROCEDURE — 97110 THERAPEUTIC EXERCISES: CPT

## 2022-05-11 PROCEDURE — 97140 MANUAL THERAPY 1/> REGIONS: CPT

## 2022-05-11 NOTE — FLOWSHEET NOTE
[facility] [address] [facility] [address]  Phone: [phone] Fax: [phone]    Physical Therapy Treatment Note/ Progress Report:     Date:  2022    Patient Name:  Yumiko Moore    :  1960  MRN: [id number]  Restrictions/Precautions:    Medical/Treatment Diagnosis Information:  · Diagnosis: ,cervical stenosis, neck pain  · Treatment Diagnosis: cervical stenosis M48.02, neck pain P13.5  Insurance/Certification information:  PT Insurance Information: [employer]  Physician Information:  Referring Practitioner: Dr Annemarie Carmichael of care signed (Y/N):     Date of Patient follow up with Physician:      Progress Report: [x]  Yes  []  No     Date Range for reporting period:  Beginnin2022  Ending:     Progress report due (10 Rx/or 30 days whichever is less): 2575     Recertification due (POC duration/ or 90 days whichever is less): 2022     Visit # Insurance Allowable Auth Needed   7 [place] () []Yes    [x]No     Pain level:  0/10     SUBJECTIVE:  Patient reports that she feels like motion continues to improve, but still limited. Can tell that steroid is wearing off and had a bit of more difficulty with sleeping last night. Is driving on her own now. Will be planning to return to work in near future.  .       OBJECTIVE:    Observation:    Test measurements:      RESTRICTIONS/PRECAUTIONS: ACDF C5/6 2022    Exercises/Interventions:   Therapeutic Ex (66556)  Min: 25 Sets/sec Reps Notes   AROM c/spine rotation 1 10    Half kneel thoracic rotation stretch 10 10 each   T- band Row/pinch 1 10 green   T- band lower pinch 1 10 green   T-spine ext over roll 10 10    No money 1 15 green   Cervical retraction 10 10    Cervical spine stepping isometrics with red band 10 10 Retro stepping and lateral stepping bilat   Quadruped w cerv retract      Front plank      Side plank      Chin tuck 5 10 supine   Chin tuck w lift      Chin tuck w rotation ball on wall 1 10    Standing shoulder elevation to 90 degrees, flexion, scaption and abd 1 15 2 lb         Manual Intervention (13643)  Min: 20      Cerv ROM 1 5 Grade II-III C2/3 and C3/4, C6/7 and C7/T1   Thoracic mobs/manip 1 2    CT manip      Rib mobilizations 1 5    STM 1 10 Cervical paraspinals, UT   DN            NMR re-education (02756)  Min:      T-spine Ext- foam roll      Chin tucks       No money      Wall Postural re-ed            Therapeutic Activity (80248)  Min:                                        Therapeutic Exercise and NMR EXR  [x] (20163) Provided verbal/tactile cueing for activities related to strengthening, flexibility, endurance, ROM  for improvements in cervical, postural, scapular, scapulothoracic and UE control with self care, reaching, carrying, lifting, house/yardwork, driving/computer work. [x] (69714) Provided verbal/tactile cueing for activities related to improving balance, coordination, kinesthetic sense, posture, motor skill, proprioception  to assist with cervical, scapular, scapulothoracic and UE control with self care, reaching, carrying, lifting, house/yardwork, driving/computer work. Therapeutic Activities:    [] (20554 or 18974) Provided verbal/tactile cueing for activities related to improving balance, coordination, kinesthetic sense, posture, motor skill, proprioception and motor activation to allow for proper function of cervical, scapular, scapulothoracic and UE control with self care, carrying, lifting, driving/computer work.      Home Exercise Program:    [x] (44232) Reviewed/Progressed HEP activities related to strengthening, flexibility, endurance, ROM of cervical, scapular, scapulothoracic and UE control with self care, reaching, carrying, lifting, house/yardwork, driving/computer work  [] (52952) Reviewed/Progressed HEP activities related to improving balance, coordination, kinesthetic sense, posture, motor skill, proprioception of cervical, scapular, scapulothoracic and UE control with self care, reaching, carrying, lifting, house/yardwork, driving/computer work      Manual Treatments:  PROM / STM / Oscillations-Mobs:  G-I, II, III, IV (PA's, Inf., Post.)  [x] (51010) Provided manual therapy to mobilize soft tissue/joints of cervical/CT, scapular GHJ and UE for the purpose of decreasing headache, modulating pain, promoting relaxation,  increasing ROM, reducing/eliminating soft tissue swelling/inflammation/restriction, improving soft tissue extensibility and allowing for proper ROM for normal function with self care, reaching, carrying, lifting, house/yardwork, driving/computer work    Modalities:      Charges:  Timed Code Treatment Minutes: 45   Total Treatment Minutes: 45     [] EVAL (LOW) 19748 (typically 20 minutes face-to-face)  [] EVAL (MOD) 40568 (typically 30 minutes face-to-face)  [] EVAL (HIGH) 12906 (typically 45 minutes face-to-face)  [] RE-EVAL     [x] UK(25913) x 2   [] Dry needle 1 or 2 Muscles (29677)  [] NMR (31168) x     [] Dry needle 3+ Muscles (16685)  [x] Manual (10891) x   1    [] TA (82704) x     [] Mech Traction (03594)  [] ES(attended) (23217)     [] ES (un) (56201):   [] VASO (05447)  [] Other:    If [facility] Please Indicate Time In/Out  CPT Code Time in Time out                                     GOALS:  Patient stated goal: improve AROM to allow safe driving  [] Progressing: [] Met: [] Not Met: [] Adjusted    Therapist goals for Patient:   Short Term Goals: To be achieved in: 2 weeks  1. Independent in HEP and progression per patient tolerance, in order to prevent re-injury. [] Progressing: [] Met: [] Not Met: [] Adjusted  2. Patient will have a decrease in pain to facilitate improvement in movement, function, and ADLs as indicated by Functional Deficits. [] Progressing: [] Met: [] Not Met: [] Adjusted    Long Term Goals: To be achieved in: 4 weeks  1.  Disability index score of 42 or better with Foto FS and less than 40% disability for NDI to assist with reaching prior level of function. [] Progressing: [] Met: [] Not Met: [] Adjusted  2. Patient will demonstrate increased AROM to [facility] of cervical/thoracic spine to allow for proper joint functioning as indicated by patients Functional Deficits. [] Progressing: [] Met: [] Not Met: [] Adjusted  3. Patient will demonstrate an increase in postural awareness and control and activation of  Deep cervical stabilizers to allow for proper functional mobility as indicated by patients Functional Deficits. [] Progressing: [] Met: [] Not Met: [] Adjusted  4. Patient will return to lifting/reaching functional activities without increased symptoms or restriction. [] Progressing: [] Met: [] Not Met: [] Adjusted  5. Patient will report being able to sleep at least 6 hours and drive without increased symptoms or restriction. (patient specific functional goal)    [] Progressing: [] Met: [] Not Met: [] Adjusted    ASSESSMENT:  Improvement in stiffness at conclusion with patient able to achieve greater cervical rotation and flexion AROM at conclusion and less overall pain in neck. Patient still tight in UT and levator. Will plan to DN next session. Continues to need cues for decreased UT over utilization. Treatment/Activity Tolerance:  [x] Patient tolerated treatment well [] Patient limited by fatique  [] Patient limited by pain  [] Patient limited by other medical complications  [] Other:     Overall Progression Towards Functional goals/ Treatment Progress Update:  [] Patient is progressing as expected towards functional goals listed. [] Progression is slowed due to complexities/Impairments listed. [] Progression has been slowed due to co-morbidities.   [x] Plan just implemented, too soon to assess goals progression <30days   [] Goals require adjustment due to lack of progress  [] Patient is not progressing as expected and requires additional follow up with physician  [] Other    Prognosis for POC: [x] Good [] Fair  [] Poor    Patient requires continued skilled intervention: [x] Yes  [] No        PLAN:   [x] Continue per plan of care [] Alter current plan (see comments)  [] Plan of care initiated [] Hold pending MD visit [] Discharge    Electronically signed by: Sirisha Alexis PT    Note: If patient does not return for scheduled/recommended follow up visits, this note will serve as a discharge from care along with the most recent update on progress.

## 2022-05-13 ENCOUNTER — APPOINTMENT (OUTPATIENT)
Dept: PHYSICAL THERAPY | Age: 62
End: 2022-05-13
Payer: COMMERCIAL

## 2022-05-16 ENCOUNTER — HOSPITAL ENCOUNTER (OUTPATIENT)
Dept: PHYSICAL THERAPY | Age: 62
Setting detail: THERAPIES SERIES
Discharge: HOME | End: 2022-05-16
Payer: COMMERCIAL

## 2022-05-16 PROCEDURE — 97140 MANUAL THERAPY 1/> REGIONS: CPT

## 2022-05-16 PROCEDURE — 20560 NDL INSJ W/O NJX 1 OR 2 MUSC: CPT

## 2022-05-16 PROCEDURE — 97032 APPL MODALITY 1+ESTIM EA 15: CPT

## 2022-05-16 NOTE — PLAN OF CARE
[place] [address]  Phone: [phone] Fax: [phone]        Physical Therapy Re-Certification Plan of Romelia Oconnor      Dear Dr Herson Galdamez  ,    We had the pleasure of treating the following patient for physical therapy services at [address] [place] [address]. A summary of our findings can be found in the updated assessment below. This includes our plan of care. If you have any questions or concerns regarding these findings, please do not hesitate to contact me at the office phone number checked above.   Thank you for the referral.     Physician Signature:________________________________Date:__________________  By signing above (or electronic signature), therapists plan is approved by physician    Date Range Of Visits: 2022 thru 2022  Total Visits to Date: 8  Overall Response to Treatment:   [x]Patient is responding well to treatment and improvement is noted with regards  to goals   []Patient should continue to improve in reasonable time if they continue HEP   []Patient has plateaued and is no longer responding to skilled PT intervention    []Patient is getting worse and would benefit from return to referring MD   []Patient unable to adhere to initial POC   []Other:     Physical Therapy Treatment Note/ Progress Report:     Date:  2022    Patient Name:  Mine Ozuna    :  1960  MRN: [id number]  Restrictions/Precautions:    Medical/Treatment Diagnosis Information:  · Diagnosis: ,cervical stenosis, neck pain  · Treatment Diagnosis: cervical stenosis M48.02, neck pain S33.4  Insurance/Certification information:  PT Insurance Information: Nichole  Physician Information:  Referring Practitioner: Dr Carlota Donovan of care signed (Y/N):     Date of Patient follow up with Physician:      Progress Report: [x]  Yes  []  No     Date Range for reporting period:  Beginnin2022  Ending: 5/16/2022    Progress report due (10 Rx/or 30 days whichever is less): 1/90/4012     Recertification due (POC duration/ or 90 days whichever is less): 6/20/2022     Visit # Insurance Allowable Auth Needed   8 Nichole (20/yr) []Yes    [x]No     Pain level:  0/10     SUBJECTIVE:  Patient reports that she took a step back this past weekend. Was in car a lot over the weekend. Steroid feels like it is out of her system. Woke 2x at night last night. Willing to try DN.          OBJECTIVE:    Observation:    Test measurements:     MEASURES 5/17/2022:  CERV ROM       Cervical Flexion 35     Cervical Extension 25       Left Right   Cervical SB 30 25   Cervical rotation 45 42       INITIAL MEASURES:  CERV ROM       Cervical Flexion 15     Cervical Extension 5       Left Right   Cervical SB 10 15   Cervical rotation 15 15       RESTRICTIONS/PRECAUTIONS: ACDF C5/6 2/1/2022    Exercises/Interventions:   Therapeutic Ex (58146)  Min: 0 Sets/sec Reps Notes   AROM c/spine rotation 1 10    Half kneel thoracic rotation stretch 10 10 each   T- band Row/pinch 1 10 green   T- band lower pinch 1 10 green   T-spine ext over roll 10 10    No money 1 15 green   Cervical retraction 10 10    Cervical spine stepping isometrics with red band 10 10 Retro stepping and lateral stepping bilat   Quadruped w cerv retract      Front plank      Side plank      Chin tuck 5 10 supine   Chin tuck w lift      Chin tuck w rotation ball on wall 1 10    Standing shoulder elevation to 90 degrees, flexion, scaption and abd 1 15 2 lb         Manual Intervention (84345)  Min: 20      Cerv ROM 1 5 Grade II-III C2/3 and C3/4, C6/7 and C7/T1   Thoracic mobs/manip 1 2    CT manip      Rib mobilizations 1 5 bilat 1st rib   STM 1 10 Cervical paraspinals, UT   DN            NMR re-education (00766)  Min:      T-spine Ext- foam roll      Chin tucks       No money      Wall Postural re-ed            Therapeutic Activity (75778)  Min:                        DN  5    ESTIM 10        Spoke with   regarding the use of Dry Needling     Dry needling manual therapy: consisted on the placement of 6 needles in the following muscles:  B UT and R levator scap. A 50 mm needle was inserted, piston, rotated, and coned to produce intramuscular mobilization. These techniques were used to restore functional range of motion, reduce muscle spasm and induce healing in the corresponding musculature. (18838)  Clean Technique was utilized today while applying Dry needling treatment. The treatment sites where cleaned with 70% solution of  isopropyl alcohol . The PT washed their hands and utilized treatment gloves along with hand  prior to inserting the needles. All needles where removed and discarded in the appropriate sharps container. MD has given verbal and/or written approval for this treatment. Attended low frequency (1-20Hz) electrical stimulation was utilized in conjunction with Dry Needling:  the Estim was manipulated between all above needles for a period of 10 min. at 4-6 volts. The low frequency electrical stimulation was used to help reduce muscle spasm and help to interrupt /[place] the pain cycle. (26706)       Therapeutic Exercise and NMR EXR  [x] (69631) Provided verbal/tactile cueing for activities related to strengthening, flexibility, endurance, ROM  for improvements in cervical, postural, scapular, scapulothoracic and UE control with self care, reaching, carrying, lifting, house/yardwork, driving/computer work. [x] (02594) Provided verbal/tactile cueing for activities related to improving balance, coordination, kinesthetic sense, posture, motor skill, proprioception  to assist with cervical, scapular, scapulothoracic and UE control with self care, reaching, carrying, lifting, house/yardwork, driving/computer work.     Therapeutic Activities:    [] (88180 or ) Provided verbal/tactile cueing for activities related to improving balance, coordination, kinesthetic sense, posture, motor skill, proprioception and motor activation to allow for proper function of cervical, scapular, scapulothoracic and UE control with self care, carrying, lifting, driving/computer work.      Home Exercise Program:    [x] (36314) Reviewed/Progressed HEP activities related to strengthening, flexibility, endurance, ROM of cervical, scapular, scapulothoracic and UE control with self care, reaching, carrying, lifting, house/yardwork, driving/computer work  [] (17011) Reviewed/Progressed HEP activities related to improving balance, coordination, kinesthetic sense, posture, motor skill, proprioception of cervical, scapular, scapulothoracic and UE control with self care, reaching, carrying, lifting, house/yardwork, driving/computer work      Manual Treatments:  PROM / STM / Oscillations-Mobs:  G-I, II, III, IV (PA's, Inf., Post.)  [x] (04405) Provided manual therapy to mobilize soft tissue/joints of cervical/CT, scapular GHJ and UE for the purpose of decreasing headache, modulating pain, promoting relaxation,  increasing ROM, reducing/eliminating soft tissue swelling/inflammation/restriction, improving soft tissue extensibility and allowing for proper ROM for normal function with self care, reaching, carrying, lifting, house/yardwork, driving/computer work    Modalities:      Charges:  Timed Code Treatment Minutes: 20   Total Treatment Minutes: 35     [] EVAL (LOW) 42175 (typically 20 minutes face-to-face)  [] EVAL (MOD) 04975 (typically 30 minutes face-to-face)  [] EVAL (HIGH) 16691 (typically 45 minutes face-to-face)  [] RE-EVAL     [] WE(94693) x    [x] Dry needle 1 or 2 Muscles (51019)  [] NMR (11319) x     [] Dry needle 3+ Muscles (72551)  [x] Manual (77177) x   1    [] TA (47760) x     [] Mech Traction (83808)  [x] ES(attended) (12688)     [] ES (un) (22432):   [] VASO (33179)  [] Other:    If [facility] Please Indicate Time In/Out  CPT Code Time in Time out GOALS:  Patient stated goal: improve AROM to allow safe driving  [] Progressing: [x] Met: [] Not Met: [] Adjusted    Therapist goals for Patient:   Short Term Goals: To be achieved in: 2 weeks  1. Independent in HEP and progression per patient tolerance, in order to prevent re-injury. [] Progressing: [x] Met: [] Not Met: [] Adjusted  2. Patient will have a decrease in pain to facilitate improvement in movement, function, and ADLs as indicated by Functional Deficits. [x] Progressing: [] Met: [] Not Met: [] Adjusted    Long Term Goals: To be achieved in: 4 weeks  1. Disability index score of 42 or better with Foto FS and less than 40% disability for NDI to assist with reaching prior level of function. [x] Progressing: [] Met: [] Not Met: [] Adjusted  2. Patient will demonstrate [facility] to allow for proper joint functioning as indicated by patients Functional Deficits. [x] Progressing: [] Met: [] Not Met: [] Adjusted  3. Patient will demonstrate an increase in postural awareness and control and activation of  Deep cervical stabilizers to allow for proper functional mobility as indicated by patients Functional Deficits. [x] Progressing: [] Met: [] Not Met: [] Adjusted  4. Patient will return to lifting/reaching functional activities without increased symptoms or restriction. [x] Progressing: [] Met: [] Not Met: [] Adjusted  5. Patient will report being able to sleep at least 6 hours and drive without increased symptoms or restriction. (patient specific functional goal)    [x] Progressing: [] Met: [] Not Met: [] Adjusted    ASSESSMENT: Patient has been seen for 8 visits of physical therapy to address soft tissue restriction and cervical joint restrictions above and below ACDF level (C5/6). Patient has had chronic restriction in UT and LS, which was DN for the first time today.  Overall, since onset of therapy, patient ROM has improved significantly, however still is not to expected ranges. Additionally, patient with improving UE and periscapular strength. Pain in neck continues to persist, especially feeling a bit more now that steroid has dissipated. Patient has been sleeping 2-4 hours at a time. Patient will benefit from further skilled PT services. Needs further improvement in ROM, cervical, shoulder and periscapular strengthening, as well as continued cues for decreased UT over utilization on R with strengthening. Treatment/Activity Tolerance:  [x] Patient tolerated treatment well [] Patient limited by fatique  [] Patient limited by pain  [] Patient limited by other medical complications  [] Other:     Overall Progression Towards Functional goals/ Treatment Progress Update:  [x] Patient is progressing as expected towards functional goals listed. [] Progression is slowed due to complexities/Impairments listed. [] Progression has been slowed due to co-morbidities. [] Plan just implemented, too soon to assess goals progression <30days   [] Goals require adjustment due to lack of progress  [] Patient is not progressing as expected and requires additional follow up with physician  [] Other    Prognosis for POC: [x] Good [] Fair  [] Poor    Patient requires continued skilled intervention: [x] Yes  [] No        PLAN:   [x] Continue per plan of care [] Alter current plan (see comments)  [] Plan of care initiated [] Hold pending MD visit [] Discharge    Electronically signed by: Jeffrey Carson PT    Note: If patient does not return for scheduled/recommended follow up visits, this note will serve as a discharge from care along with the most recent update on progress.

## 2022-05-24 ENCOUNTER — HOSPITAL ENCOUNTER (OUTPATIENT)
Dept: PHYSICAL THERAPY | Age: 62
Setting detail: THERAPIES SERIES
Discharge: HOME | End: 2022-05-24
Payer: COMMERCIAL

## 2022-05-24 PROCEDURE — 97110 THERAPEUTIC EXERCISES: CPT

## 2022-05-24 PROCEDURE — 97140 MANUAL THERAPY 1/> REGIONS: CPT

## 2022-05-24 NOTE — FLOWSHEET NOTE
[place] [address] [address] [place]  Phone: [phone] Fax: [phone]      Physical Therapy Treatment Note/ Progress Report:     Date:  2022    Patient Name:  Henrry Flood    :  1960  MRN: [id number]  Restrictions/Precautions:    Medical/Treatment Diagnosis Information:  · Diagnosis: ,cervical stenosis, neck pain  · Treatment Diagnosis: cervical stenosis M48.02, neck pain F11.2  Insurance/Certification information:  PT Insurance Information: [place]  Physician Information:  Referring Practitioner: Dr Tuan Field of care signed (Y/N):     Date of Patient follow up with Physician:      Progress Report: []  Yes  [x]  No     Date Range for reporting period:  Beginnin2022  Ending:     Progress report due (10 Rx/or 30 days whichever is less):      Recertification due (POC duration/ or 90 days whichever is less): 2022     Visit # Insurance Allowable Auth Needed   9 [place] () []Yes    [x]No     Pain level:  0/10     SUBJECTIVE:  Patient reports that she took a step back again this weekend driving too and from [place] to work at her [email or web address]. Feeling very tight through left upper trap. States she had tingling in her arms after DN last week.           OBJECTIVE:    Observation:    Test measurements:     MEASURES 2022:  CERV ROM       Cervical Flexion 35     Cervical Extension 25       Left Right   Cervical SB 30 25   Cervical rotation 45 42       INITIAL MEASURES:  CERV ROM       Cervical Flexion 15     Cervical Extension 5       Left Right   Cervical SB 10 15   Cervical rotation 15 15       RESTRICTIONS/PRECAUTIONS: ACDF C5/6 2022    Exercises/Interventions:   Therapeutic Ex (28521)  Min: 0 Sets/sec Reps Notes   AROM c/spine rotation 1 10    Half kneel thoracic rotation stretch 10 10 each   T- band Row/pinch 1 10 green   T- band lower pinch   green   T-spine ext over roll      No money   green Cervical retraction      Cervical spine stepping isometrics with red band   Retro stepping and lateral stepping bilat   Quadruped w cerv retract      Front plank      Side plank      Chin tuck   supine   Chin tuck w lift      Chin tuck w rotation ball on wall      Standing shoulder elevation to 90 degrees, flexion, scaption and abd   2 lb         Manual Intervention (14886)  Min: 35      Cerv ROM 1 8 Grade II-III C2/3 and C3/4, C6/7 and C7/T1   Thoracic mobs/manip 1 5 prone   CT manip      Rib mobilizations 1 5 bilat 1st rib   STM 1 15 Cervical paraspinals, UT   DN            NMR re-education (62619)  Min:      T-spine Ext- foam roll      Chin tucks       No money      Wall Postural re-ed            Therapeutic Activity (47392)  Min:                        DN  5    ESTIM  10              Therapeutic Exercise and NMR EXR  [x] (45250) Provided verbal/tactile cueing for activities related to strengthening, flexibility, endurance, ROM  for improvements in cervical, postural, scapular, scapulothoracic and UE control with self care, reaching, carrying, lifting, house/yardwork, driving/computer work. [x] (92104) Provided verbal/tactile cueing for activities related to improving balance, coordination, kinesthetic sense, posture, motor skill, proprioception  to assist with cervical, scapular, scapulothoracic and UE control with self care, reaching, carrying, lifting, house/yardwork, driving/computer work. Therapeutic Activities:    [] (39464 or 12736) Provided verbal/tactile cueing for activities related to improving balance, coordination, kinesthetic sense, posture, motor skill, proprioception and motor activation to allow for proper function of cervical, scapular, scapulothoracic and UE control with self care, carrying, lifting, driving/computer work.      Home Exercise Program:    [x] (66340) Reviewed/Progressed HEP activities related to strengthening, flexibility, endurance, ROM of cervical, scapular, scapulothoracic and UE control with self care, reaching, carrying, lifting, house/yardwork, driving/computer work  [] (78818) Reviewed/Progressed HEP activities related to improving balance, coordination, kinesthetic sense, posture, motor skill, proprioception of cervical, scapular, scapulothoracic and UE control with self care, reaching, carrying, lifting, house/yardwork, driving/computer work      Manual Treatments:  PROM / STM / Oscillations-Mobs:  G-I, II, III, IV (PA's, Inf., Post.)  [x] (48004) Provided manual therapy to mobilize soft tissue/joints of cervical/CT, scapular GHJ and UE for the purpose of decreasing headache, modulating pain, promoting relaxation,  increasing ROM, reducing/eliminating soft tissue swelling/inflammation/restriction, improving soft tissue extensibility and allowing for proper ROM for normal function with self care, reaching, carrying, lifting, house/yardwork, driving/computer work    Modalities:      Charges:  Timed Code Treatment Minutes: 43   Total Treatment Minutes: 43     [] EVAL (LOW) 94137 (typically 20 minutes face-to-face)  [] EVAL (MOD) 34179 (typically 30 minutes face-to-face)  [] EVAL (HIGH) 35722 (typically 45 minutes face-to-face)  [] RE-EVAL     [x] DZ(87746) x 1   [] Dry needle 1 or 2 Muscles (14115)  [] NMR (50331) x     [] Dry needle 3+ Muscles (78496)  [x] Manual (62693) x   2    [] TA (58156) x     [] Mech Traction (44596)  [] ES(attended) (29786)     [] ES (un) (45351):   [] VASO (77462)  [] Other:    If [facility] Please Indicate Time In/Out  CPT Code Time in Time out                                     GOALS:  Patient stated goal: improve AROM to allow safe driving  [] Progressing: [x] Met: [] Not Met: [] Adjusted    Therapist goals for Patient:   Short Term Goals: To be achieved in: 2 weeks  1. Independent in HEP and progression per patient tolerance, in order to prevent re-injury. [] Progressing: [x] Met: [] Not Met: [] Adjusted  2.  Patient will have a decrease in pain to facilitate improvement in movement, function, and ADLs as indicated by Functional Deficits. [x] Progressing: [] Met: [] Not Met: [] Adjusted    Long Term Goals: To be achieved in: 4 weeks  1. Disability index score of 42 or better with Foto FS and less than 40% disability for NDI to assist with reaching prior level of function. [x] Progressing: [] Met: [] Not Met: [] Adjusted  2. Patient will demonstrate increased AROM to [facility] of cervical/thoracic spine to allow for proper joint functioning as indicated by patients Functional Deficits. [x] Progressing: [] Met: [] Not Met: [] Adjusted  3. Patient will demonstrate an increase in postural awareness and control and activation of  Deep cervical stabilizers to allow for proper functional mobility as indicated by patients Functional Deficits. [x] Progressing: [] Met: [] Not Met: [] Adjusted  4. Patient will return to lifting/reaching functional activities without increased symptoms or restriction. [x] Progressing: [] Met: [] Not Met: [] Adjusted  5. Patient will report being able to sleep at least 6 hours and drive without increased symptoms or restriction. (patient specific functional goal)    [x] Progressing: [] Met: [] Not Met: [] Adjusted    ASSESSMENT: Patient has been seen for 9 visits of physical therapy to address soft tissue restriction and cervical joint restrictions above and below ACDF level (C5/6). Overall, since onset of therapy, patient ROM has improved significantly, however still is not to expected ranges. Additionally, patient with improving UE and periscapular strength. Focused today on soft tissue and mobility restrictions as they were worse 2/2 prolonged sitting in car, very tight through L side w/ referring pain up into skull w/ L rotation. Patient will benefit from further skilled PT services.  Needs further improvement in ROM, cervical, shoulder and periscapular strengthening, as well as continued cues for decreased UT over utilization on R with strengthening. Treatment/Activity Tolerance:  [x] Patient tolerated treatment well [] Patient limited by fatique  [] Patient limited by pain  [] Patient limited by other medical complications  [] Other:     Overall Progression Towards Functional goals/ Treatment Progress Update:  [x] Patient is progressing as expected towards functional goals listed. [] Progression is slowed due to complexities/Impairments listed. [] Progression has been slowed due to co-morbidities. [] Plan just implemented, too soon to assess goals progression <30days   [] Goals require adjustment due to lack of progress  [] Patient is not progressing as expected and requires additional follow up with physician  [] Other    Prognosis for POC: [x] Good [] Fair  [] Poor    Patient requires continued skilled intervention: [x] Yes  [] No        PLAN:   [x] Continue per plan of care [] Alter current plan (see comments)  [] Plan of care initiated [] Hold pending MD visit [] Discharge    Electronically signed by: Hao Stone PT    Note: If patient does not return for scheduled/recommended follow up visits, this note will serve as a discharge from care along with the most recent update on progress.

## 2022-05-26 ENCOUNTER — HOSPITAL ENCOUNTER (OUTPATIENT)
Dept: PHYSICAL THERAPY | Age: 62
Setting detail: THERAPIES SERIES
Discharge: HOME | End: 2022-05-26
Payer: COMMERCIAL

## 2022-05-26 PROCEDURE — 97110 THERAPEUTIC EXERCISES: CPT

## 2022-05-26 PROCEDURE — 97140 MANUAL THERAPY 1/> REGIONS: CPT

## 2022-05-26 NOTE — FLOWSHEET NOTE
[place] [address] [facility]  Phone: [phone] Fax: [phone]      Physical Therapy Treatment Note/ Progress Report:     Date:  2022    Patient Name:  Noel Gupta    :  1960  MRN: [id number]  Restrictions/Precautions:    Medical/Treatment Diagnosis Information:  · Diagnosis: ,cervical stenosis, neck pain  · Treatment Diagnosis: cervical stenosis M48.02, neck pain H51.7  Insurance/Certification information:  PT Insurance Information: [place]  Physician Information:  Referring Practitioner: Dr Robert Shaffer of care signed (Y/N):     Date of Patient follow up with Physician:      Progress Report: []  Yes  [x]  No     Date Range for reporting period:  Beginnin2022  Ending:     Progress report due (10 Rx/or 30 days whichever is less):      Recertification due (POC duration/ or 90 days whichever is less): 2022     Visit # Insurance Allowable Auth Needed   10 [place] (20/yr) []Yes    [x]No     Pain level:  0/10     SUBJECTIVE:  Patient states the upper part of her neck/base of skull was sore and radiated up into back of head a little after last session and was tender to wear a hat. Upper neck feeling better currently but felt tight pulling through the neck on both sides driving this morning.          OBJECTIVE:    Observation:    Test measurements:     MEASURES 2022:  CERV ROM       Cervical Flexion 35     Cervical Extension 25       Left Right   Cervical SB 30 25   Cervical rotation 45 42       INITIAL MEASURES:  CERV ROM       Cervical Flexion 15     Cervical Extension 5       Left Right   Cervical SB 10 15   Cervical rotation 15 15       RESTRICTIONS/PRECAUTIONS: ACDF C5/6 2022    Exercises/Interventions:   Therapeutic Ex (94849)  Min: 15 Sets/sec Reps Notes   AROM c/spine rotation 1 10    Half kneel thoracic rotation stretch 10 10 each   T- band Row/pinch   green   T- band lower pinch green   T-spine ext over roll      No money   green   Cervical retraction      Cervical spine stepping isometrics with red band   Retro stepping and lateral stepping bilat   Quadruped w cerv retract      Front plank      Side plank      Chin tuck 5'' 10 supine   Chin tuck w lift      Chin tuck w rotation ball on wall      Standing shoulder elevation to 90 degrees, flexion, scaption and abd   2 lb   Prone scap squeeze 5'' 10    Manual Intervention (57808)  Min: 35      Cerv ROM 1 8 Grade II-III C2/3 and C3/4, C6/7 and C7/T1   Thoracic mobs/manip 1 8 Prone- inferior rib/upper Tspine ; PA mid Tspine   CT manip      Rib mobilizations 1 5 bilat 1st rib   STM 1 15 Cervical paraspinals, UT   DN            NMR re-education (50123)  Min:      T-spine Ext- foam roll      Chin tucks       No money      Wall Postural re-ed            Therapeutic Activity (03004)  Min:                        DN      ESTIM                Therapeutic Exercise and NMR EXR  [x] (84328) Provided verbal/tactile cueing for activities related to strengthening, flexibility, endurance, ROM  for improvements in cervical, postural, scapular, scapulothoracic and UE control with self care, reaching, carrying, lifting, house/yardwork, driving/computer work. [x] (15087) Provided verbal/tactile cueing for activities related to improving balance, coordination, kinesthetic sense, posture, motor skill, proprioception  to assist with cervical, scapular, scapulothoracic and UE control with self care, reaching, carrying, lifting, house/yardwork, driving/computer work. Therapeutic Activities:    [] (95215 or 46715) Provided verbal/tactile cueing for activities related to improving balance, coordination, kinesthetic sense, posture, motor skill, proprioception and motor activation to allow for proper function of cervical, scapular, scapulothoracic and UE control with self care, carrying, lifting, driving/computer work.      Home Exercise Program:    [x] (51475) Reviewed/Progressed HEP activities related to strengthening, flexibility, endurance, ROM of cervical, scapular, scapulothoracic and UE control with self care, reaching, carrying, lifting, house/yardwork, driving/computer work  [] (34072) Reviewed/Progressed HEP activities related to improving balance, coordination, kinesthetic sense, posture, motor skill, proprioception of cervical, scapular, scapulothoracic and UE control with self care, reaching, carrying, lifting, house/yardwork, driving/computer work      Manual Treatments:  PROM / STM / Oscillations-Mobs:  G-I, II, III, IV (PA's, Inf., Post.)  [x] (93710) Provided manual therapy to mobilize soft tissue/joints of cervical/CT, scapular GHJ and UE for the purpose of decreasing headache, modulating pain, promoting relaxation,  increasing ROM, reducing/eliminating soft tissue swelling/inflammation/restriction, improving soft tissue extensibility and allowing for proper ROM for normal function with self care, reaching, carrying, lifting, house/yardwork, driving/computer work    Modalities:      Charges:  Timed Code Treatment Minutes: 43   Total Treatment Minutes: 43     [] EVAL (LOW) 65296 (typically 20 minutes face-to-face)  [] EVAL (MOD) 79902 (typically 30 minutes face-to-face)  [] EVAL (HIGH) 90175 (typically 45 minutes face-to-face)  [] RE-EVAL     [x] ZG(50718) x 1   [] Dry needle 1 or 2 Muscles (49411)  [] NMR (30276) x     [] Dry needle 3+ Muscles (85151)  [x] Manual (83886) x   2    [] TA (03535) x     [] Mech Traction (75183)  [] ES(attended) (84251)     [] ES (un) (62701):   [] VASO (25033)  [] Other:    If [facility] Please Indicate Time In/Out  CPT Code Time in Time out                                     GOALS:  Patient stated goal: improve AROM to allow safe driving  [] Progressing: [x] Met: [] Not Met: [] Adjusted    Therapist goals for Patient:   Short Term Goals: To be achieved in: 2 weeks  1.  Independent in HEP and progression per patient tolerance, in order to prevent re-injury. [] Progressing: [x] Met: [] Not Met: [] Adjusted  2. Patient will have a decrease in pain to facilitate improvement in movement, function, and ADLs as indicated by Functional Deficits. [x] Progressing: [] Met: [] Not Met: [] Adjusted    Long Term Goals: To be achieved in: 4 weeks  1. Disability index score of 42 or better with Foto FS and less than 40% disability for NDI to assist with reaching prior level of function. [x] Progressing: [] Met: [] Not Met: [] Adjusted  2. Patient will demonstrate increased AROM to [facility] of cervical/thoracic spine to allow for proper joint functioning as indicated by patients Functional Deficits. [x] Progressing: [] Met: [] Not Met: [] Adjusted  3. Patient will demonstrate an increase in postural awareness and control and activation of  Deep cervical stabilizers to allow for proper functional mobility as indicated by patients Functional Deficits. [x] Progressing: [] Met: [] Not Met: [] Adjusted  4. Patient will return to lifting/reaching functional activities without increased symptoms or restriction. [x] Progressing: [] Met: [] Not Met: [] Adjusted  5. Patient will report being able to sleep at least 6 hours and drive without increased symptoms or restriction. (patient specific functional goal)    [x] Progressing: [] Met: [] Not Met: [] Adjusted    ASSESSMENT: Patient has been seen for 10 visits of physical therapy to address soft tissue restriction and cervical joint restrictions above and below ACDF level (C5/6). Overall, since onset of therapy, patient ROM has improved significantly, however still is not to expected ranges. Additionally, patient with improving UE and periscapular strength. Focused today on soft tissue and mobility restrictions as they still remain slightly worse from prolonged sitting in car, was more restricted R UT today. Cues needed for scap squeeze without UT compensation.  Patient will benefit from further skilled PT services. Needs further improvement in ROM, cervical, shoulder and periscapular strengthening, as well as continued cues for decreased UT over utilization on R with strengthening. Treatment/Activity Tolerance:  [x] Patient tolerated treatment well [] Patient limited by fatique  [] Patient limited by pain  [] Patient limited by other medical complications  [] Other:     Overall Progression Towards Functional goals/ Treatment Progress Update:  [x] Patient is progressing as expected towards functional goals listed. [] Progression is slowed due to complexities/Impairments listed. [] Progression has been slowed due to co-morbidities. [] Plan just implemented, too soon to assess goals progression <30days   [] Goals require adjustment due to lack of progress  [] Patient is not progressing as expected and requires additional follow up with physician  [] Other    Prognosis for POC: [x] Good [] Fair  [] Poor    Patient requires continued skilled intervention: [x] Yes  [] No        PLAN:   [x] Continue per plan of care [] Alter current plan (see comments)  [] Plan of care initiated [] Hold pending MD visit [] Discharge    Electronically signed by: Eric Alvarenga PT    Note: If patient does not return for scheduled/recommended follow up visits, this note will serve as a discharge from care along with the most recent update on progress.

## 2022-06-02 ENCOUNTER — HOSPITAL ENCOUNTER (OUTPATIENT)
Dept: PHYSICAL THERAPY | Age: 62
Setting detail: THERAPIES SERIES
Discharge: HOME | End: 2022-06-02
Payer: COMMERCIAL

## 2022-06-02 PROCEDURE — 97110 THERAPEUTIC EXERCISES: CPT

## 2022-06-02 PROCEDURE — 97140 MANUAL THERAPY 1/> REGIONS: CPT

## 2022-06-02 NOTE — FLOWSHEET NOTE
[address]  Phone: [phone] Fax: [phone]      Physical Therapy Treatment Note/ Progress Report:     Date:  2022    Patient Name:  Colton Hayden    :  1960  MRN: [id number]  Restrictions/Precautions:    Medical/Treatment Diagnosis Information:  · Diagnosis: ,cervical stenosis, neck pain  · Treatment Diagnosis: cervical stenosis M48.02, neck pain F62.6  Insurance/Certification information:  PT Insurance Information: [place]  Physician Information:  Referring Practitioner: Dr Roberta Jaquez of care signed (Y/N):     Date of Patient follow up with Physician:      Progress Report: []  Yes  [x]  No     Date Range for reporting period:  Beginnin2022  Ending:     Progress report due (10 Rx/or 30 days whichever is less): 4378     Recertification due (POC duration/ or 90 days whichever is less): 2022     Visit # Insurance Allowable Auth Needed   11 [place] (20yr) []Yes    [x]No     Pain level:  0/10     SUBJECTIVE:  Patient states that she was really sore in neck after last session. Still feeling pretty limited in all motion and having difficulty with sleeping still. Feels very tight in R UT today.         OBJECTIVE:    Observation:    Test measurements:     MEASURES 2022:  CERV ROM       Cervical Flexion 35     Cervical Extension 25       Left Right   Cervical SB 30 25   Cervical rotation 45 42       INITIAL MEASURES:  CERV ROM       Cervical Flexion 15     Cervical Extension 5       Left Right   Cervical SB 10 15   Cervical rotation 15 15       RESTRICTIONS/PRECAUTIONS: ACDF C5/6 2022    Exercises/Interventions:   Therapeutic Ex (83278)  Min: 20 Sets/sec Reps Notes   AROM c/spine rotation 1 10    Half kneel thoracic rotation stretch 10 10 each   T- band Row/pinch 1 15 green   T- band lower pinch 1 15 green   T-spine ext over roll      No money 1 15 green   Cervical retraction Cervical spine stepping isometrics with red band   Retro stepping and lateral stepping bilat   Quadruped w cerv retract      Front plank      Side plank      Chin tuck 5'' 10 supine   Chin tuck w lift      Chin tuck w rotation ball on wall      Standing shoulder elevation :flexion, scaption and abd 2 10 2 lb   Prone scap squeeze 5'' 10    Manual Intervention (57749)  Min: 25      Cerv ROM 1 8 Grade II-III C2/3 and C3/4, C6/7 and C7/T1   Thoracic mobs/manip 1 4 Prone- inferior rib/upper Tspine ; PA mid Tspine   CT manip      Rib mobilizations 1 5 bilat 1st rib   STM 1 10 Cervical paraspinals, UT   DN            NMR re-education (50268)  Min:      T-spine Ext- foam roll      Chin tucks       No money      Wall Postural re-ed            Therapeutic Activity (95405)  Min:                        DN      ESTIM                Therapeutic Exercise and NMR EXR  [x] (95213) Provided verbal/tactile cueing for activities related to strengthening, flexibility, endurance, ROM  for improvements in cervical, postural, scapular, scapulothoracic and UE control with self care, reaching, carrying, lifting, house/yardwork, driving/computer work. [x] (08982) Provided verbal/tactile cueing for activities related to improving balance, coordination, kinesthetic sense, posture, motor skill, proprioception  to assist with cervical, scapular, scapulothoracic and UE control with self care, reaching, carrying, lifting, house/yardwork, driving/computer work. Therapeutic Activities:    [] (82895 or 57218) Provided verbal/tactile cueing for activities related to improving balance, coordination, kinesthetic sense, posture, motor skill, proprioception and motor activation to allow for proper function of cervical, scapular, scapulothoracic and UE control with self care, carrying, lifting, driving/computer work.      Home Exercise Program:    [x] (51301) Reviewed/Progressed HEP activities related to strengthening, flexibility, endurance, ROM of cervical, scapular, scapulothoracic and UE control with self care, reaching, carrying, lifting, house/yardwork, driving/computer work  [] (50170) Reviewed/Progressed HEP activities related to improving balance, coordination, kinesthetic sense, posture, motor skill, proprioception of cervical, scapular, scapulothoracic and UE control with self care, reaching, carrying, lifting, house/yardwork, driving/computer work      Manual Treatments:  PROM / STM / Oscillations-Mobs:  G-I, II, III, IV (PA's, Inf., Post.)  [x] (41182) Provided manual therapy to mobilize soft tissue/joints of cervical/CT, scapular GHJ and UE for the purpose of decreasing headache, modulating pain, promoting relaxation,  increasing ROM, reducing/eliminating soft tissue swelling/inflammation/restriction, improving soft tissue extensibility and allowing for proper ROM for normal function with self care, reaching, carrying, lifting, house/yardwork, driving/computer work    Modalities:      Charges:  Timed Code Treatment Minutes: 43   Total Treatment Minutes: 45     [] EVAL (LOW) 47931 (typically 20 minutes face-to-face)  [] EVAL (MOD) 12219 (typically 30 minutes face-to-face)  [] EVAL (HIGH) 76402 (typically 45 minutes face-to-face)  [] RE-EVAL     [x] CV(22449) x 1   [] Dry needle 1 or 2 Muscles (14907)  [] NMR (21955) x     [] Dry needle 3+ Muscles (47439)  [x] Manual (50004) x   2    [] TA (06895) x     [] Mech Traction (25798)  [] ES(attended) (20864)     [] ES (un) (66465):   [] VASO (75257)  [] Other:    If BWC Please Indicate Time In/Out  CPT Code Time in Time out                                     GOALS:  Patient stated goal: improve AROM to allow safe driving  [] Progressing: [x] Met: [] Not Met: [] Adjusted    Therapist goals for Patient:   Short Term Goals: To be achieved in: 2 weeks  1. Independent in HEP and progression per patient tolerance, in order to prevent re-injury. [] Progressing: [x] Met: [] Not Met: [] Adjusted  2.  Patient will have a decrease in pain to facilitate improvement in movement, function, and ADLs as indicated by Functional Deficits. [x] Progressing: [] Met: [] Not Met: [] Adjusted    Long Term Goals: To be achieved in: 4 weeks  1. Disability index score of 42 or better with Foto FS and less than 40% disability for NDI to assist with reaching prior level of function. [x] Progressing: [] Met: [] Not Met: [] Adjusted  2. Patient will demonstrate increased AROM to [facility] of cervical/thoracic spine to allow for proper joint functioning as indicated by patients Functional Deficits. [x] Progressing: [] Met: [] Not Met: [] Adjusted  3. Patient will demonstrate an increase in postural awareness and control and activation of  Deep cervical stabilizers to allow for proper functional mobility as indicated by patients Functional Deficits. [x] Progressing: [] Met: [] Not Met: [] Adjusted  4. Patient will return to lifting/reaching functional activities without increased symptoms or restriction. [x] Progressing: [] Met: [] Not Met: [] Adjusted  5. Patient will report being able to sleep at least 6 hours and drive without increased symptoms or restriction. (patient specific functional goal)    [x] Progressing: [] Met: [] Not Met: [] Adjusted    ASSESSMENT: Patient tight and limited in ROM today. Sore along R UT more than L UT. .Cues needed for scap squeeze without UT compensation. Needs further improvement in ROM, cervical, shoulder and periscapular strengthening, as well as continued cues for decreased UT over utilization on R with strengthening. Recommended patient to call doc to request further follow up given persistence with pain and discomfort, particularly with sleeping.      Treatment/Activity Tolerance:  [x] Patient tolerated treatment well [] Patient limited by fatique  [] Patient limited by pain  [] Patient limited by other medical complications  [] Other:     Overall Progression Towards Functional goals/ Treatment Progress Update:  [x] Patient is progressing as expected towards functional goals listed. [] Progression is slowed due to complexities/Impairments listed. [] Progression has been slowed due to co-morbidities. [] Plan just implemented, too soon to assess goals progression <30days   [] Goals require adjustment due to lack of progress  [] Patient is not progressing as expected and requires additional follow up with physician  [] Other    Prognosis for POC: [x] Good [] Fair  [] Poor    Patient requires continued skilled intervention: [x] Yes  [] No        PLAN:   [x] Continue per plan of care [] Alter current plan (see comments)  [] Plan of care initiated [] Hold pending MD visit [] Discharge    Electronically signed by: Belia Marquez PT    Note: If patient does not return for scheduled/recommended follow up visits, this note will serve as a discharge from care along with the most recent update on progress.

## 2022-06-07 ENCOUNTER — HOSPITAL ENCOUNTER (OUTPATIENT)
Dept: PHYSICAL THERAPY | Age: 62
Setting detail: THERAPIES SERIES
Discharge: HOME | End: 2022-06-07
Payer: COMMERCIAL

## 2022-06-07 PROCEDURE — 97140 MANUAL THERAPY 1/> REGIONS: CPT

## 2022-06-07 PROCEDURE — 97110 THERAPEUTIC EXERCISES: CPT

## 2022-06-07 NOTE — FLOWSHEET NOTE
[facility] [address]  Phone: [phone] Fax: [phone]      Physical Therapy Treatment Note/ Progress Report:     Date:  2022    Patient Name:  Erendira Schmidt    :  1960  MRN: [id number]  Restrictions/Precautions:    Medical/Treatment Diagnosis Information:  · Diagnosis: ,cervical stenosis, neck pain  · Treatment Diagnosis: cervical stenosis M48.02, neck pain I14.3  Insurance/Certification information:  PT Insurance Information: [place]  Physician Information:  Referring Practitioner: Dr Kathrin España of care signed (Y/N):     Date of Patient follow up with Physician:      Progress Report: []  Yes  [x]  No     Date Range for reporting period:  Beginnin2022  Ending:     Progress report due (10 Rx/or 30 days whichever is less):      Recertification due (POC duration/ or 90 days whichever is less): 2022     Visit # Insurance Allowable Auth Needed   12 [place] () []Yes    [x]No     Pain level:  4-5/10     SUBJECTIVE:  Patient states that she was really sore in neck after last session. Notes that she hasn't had a chance to call MD yet. Notes that she drove yesterday and had increased pins and needles into both hands while driving. States that she had a bad night sleeping. Used heat last night and then was able to get 5 hours of sleep.        OBJECTIVE:    Observation:    Test measurements:     MEASURES 2022:  CERV ROM       Cervical Flexion 35     Cervical Extension 25       Left Right   Cervical SB 30 25   Cervical rotation 45 42       INITIAL MEASURES:  CERV ROM       Cervical Flexion 15     Cervical Extension 5       Left Right   Cervical SB 10 15   Cervical rotation 15 15       RESTRICTIONS/PRECAUTIONS: ACDF C5/6 2022    Exercises/Interventions:   Therapeutic Ex (89641)  Min: 21 Sets/sec Reps Notes   AROM c/spine rotation 0     Half kneel thoracic rotation stretch 0  each   T- band Row/pinch 1 15 green   T- band lower pinch 1 15 green   T-spine ext over roll      No money 1 15 green   Cervical retraction      Cervical spine stepping isometrics with red band   Retro stepping and lateral stepping bilat   Quadruped w cerv retract 5sec 15    1st rib stretch 30 3 bilat   Upper cervical rotation stretch 30 2 bilat   Chin tuck 5'' 10 supine   Chin tuck w lift      Chin tuck w rotation ball on wall      Standing shoulder elevation :flexion, scaption and abd 0  2 lb   Prone scap squeeze 5'' 15 challenging   Manual Intervention (00085)  Min: 24      Cerv ROM 1 8 Grade II-III C2/3 and C3/4, C6/7 and C7/T1   Thoracic mobs/manip 1 4 Prone- inferior rib/upper Tspine ; PA mid Tspine   CT manip      Rib mobilizations 1 6 bilat 1st rib   STM 1 6 Cervical paraspinals, UT   DN            NMR re-education (58947)  Min:      T-spine Ext- foam roll      Chin tucks       No money      Wall Postural re-ed            Therapeutic Activity (53335)  Min:                        DN      ESTIM                Therapeutic Exercise and NMR EXR  [x] (39180) Provided verbal/tactile cueing for activities related to strengthening, flexibility, endurance, ROM  for improvements in cervical, postural, scapular, scapulothoracic and UE control with self care, reaching, carrying, lifting, house/yardwork, driving/computer work. [x] (58495) Provided verbal/tactile cueing for activities related to improving balance, coordination, kinesthetic sense, posture, motor skill, proprioception  to assist with cervical, scapular, scapulothoracic and UE control with self care, reaching, carrying, lifting, house/yardwork, driving/computer work.     Therapeutic Activities:    [] (41573 or 14210) Provided verbal/tactile cueing for activities related to improving balance, coordination, kinesthetic sense, posture, motor skill, proprioception and motor activation to allow for proper function of cervical, scapular, scapulothoracic and UE control with self care, carrying, lifting, driving/computer work.      Home Exercise Program:    [x] (53443) Reviewed/Progressed HEP activities related to strengthening, flexibility, endurance, ROM of cervical, scapular, scapulothoracic and UE control with self care, reaching, carrying, lifting, house/yardwork, driving/computer work  [] (36069) Reviewed/Progressed HEP activities related to improving balance, coordination, kinesthetic sense, posture, motor skill, proprioception of cervical, scapular, scapulothoracic and UE control with self care, reaching, carrying, lifting, house/yardwork, driving/computer work      Manual Treatments:  PROM / STM / Oscillations-Mobs:  G-I, II, III, IV (PA's, Inf., Post.)  [x] (93578) Provided manual therapy to mobilize soft tissue/joints of cervical/CT, scapular GHJ and UE for the purpose of decreasing headache, modulating pain, promoting relaxation,  increasing ROM, reducing/eliminating soft tissue swelling/inflammation/restriction, improving soft tissue extensibility and allowing for proper ROM for normal function with self care, reaching, carrying, lifting, house/yardwork, driving/computer work    Modalities:      Charges:  Timed Code Treatment Minutes: 45   Total Treatment Minutes: 45     [] EVAL (LOW) 96285 (typically 20 minutes face-to-face)  [] EVAL (MOD) 39404 (typically 30 minutes face-to-face)  [] EVAL (HIGH) 87768 (typically 45 minutes face-to-face)  [] RE-EVAL     [x] WW(78468) x 1   [] Dry needle 1 or 2 Muscles (85473)  [] NMR (93209) x     [] Dry needle 3+ Muscles (33568)  [x] Manual (10365) x   2    [] TA (39708) x     [] Mech Traction (28305)  [] ES(attended) (97130)     [] ES (un) (57884):   [] VASO (67813)  [] Other:    If BWC Please Indicate Time In/Out  CPT Code Time in Time out                                     GOALS:  Patient stated goal: improve AROM to allow safe driving  [] Progressing: [x] Met: [] Not Met: [] Adjusted    Therapist goals for Patient:   Short Term Goals: To be achieved in: 2 weeks  1. Independent in HEP and progression per patient tolerance, in order to prevent re-injury. [] Progressing: [x] Met: [] Not Met: [] Adjusted  2. Patient will have a decrease in pain to facilitate improvement in movement, function, and ADLs as indicated by Functional Deficits. [x] Progressing: [] Met: [] Not Met: [] Adjusted    Long Term Goals: To be achieved in: 4 weeks  1. Disability index score of 42 or better with Foto FS and less than 40% disability for NDI to assist with reaching prior level of function. [x] Progressing: [] Met: [] Not Met: [] Adjusted  2. Patient will demonstrate increased AROM to [facility] of cervical/thoracic spine to allow for proper joint functioning as indicated by patients Functional Deficits. [x] Progressing: [] Met: [] Not Met: [] Adjusted  3. Patient will demonstrate an increase in postural awareness and control and activation of  Deep cervical stabilizers to allow for proper functional mobility as indicated by patients Functional Deficits. [x] Progressing: [] Met: [] Not Met: [] Adjusted  4. Patient will return to lifting/reaching functional activities without increased symptoms or restriction. [x] Progressing: [] Met: [] Not Met: [] Adjusted  5. Patient will report being able to sleep at least 6 hours and drive without increased symptoms or restriction. (patient specific functional goal)    [x] Progressing: [] Met: [] Not Met: [] Adjusted    ASSESSMENT: Patient very tight in 1st rib and UT today. Today L UT > R UT. Patient restricted quite significantly today in upper cervical spine, C2-C4. Good tolerance to adding 1st rib stretch and upper cervical spine stretch. Patient reporting improved mobility and ROM with new stretching. Patient still very limited in posterior strength with cervical retraction and periscapular activation. Patient challenged with co-contraction of cervical and periscapular musculature. Patient to call doc today.  Holding visit later this week to be more conservative since patient is nearing insurance limitations. Treatment/Activity Tolerance:  [x] Patient tolerated treatment well [] Patient limited by fatique  [] Patient limited by pain  [] Patient limited by other medical complications  [] Other:     Overall Progression Towards Functional goals/ Treatment Progress Update:  [x] Patient is progressing as expected towards functional goals listed. [] Progression is slowed due to complexities/Impairments listed. [] Progression has been slowed due to co-morbidities. [] Plan just implemented, too soon to assess goals progression <30days   [] Goals require adjustment due to lack of progress  [] Patient is not progressing as expected and requires additional follow up with physician  [] Other    Prognosis for POC: [x] Good [] Fair  [] Poor    Patient requires continued skilled intervention: [x] Yes  [] No        PLAN:   [x] Continue per plan of care [] Alter current plan (see comments)  [] Plan of care initiated [] Hold pending MD visit [] Discharge    Electronically signed by: Janine Wolff PT    Note: If patient does not return for scheduled/recommended follow up visits, this note will serve as a discharge from care along with the most recent update on progress.

## 2022-06-09 ENCOUNTER — APPOINTMENT (OUTPATIENT)
Dept: PHYSICAL THERAPY | Age: 62
End: 2022-06-09
Payer: COMMERCIAL

## 2022-06-14 ENCOUNTER — APPOINTMENT (OUTPATIENT)
Dept: PHYSICAL THERAPY | Age: 62
End: 2022-06-14
Payer: COMMERCIAL

## 2022-06-16 ENCOUNTER — HOSPITAL ENCOUNTER (OUTPATIENT)
Dept: PHYSICAL THERAPY | Age: 62
Setting detail: THERAPIES SERIES
Discharge: HOME | End: 2022-06-16
Payer: COMMERCIAL

## 2022-06-16 PROCEDURE — 97110 THERAPEUTIC EXERCISES: CPT

## 2022-06-16 PROCEDURE — 97140 MANUAL THERAPY 1/> REGIONS: CPT

## 2022-06-16 NOTE — FLOWSHEET NOTE
[place] [address]  Phone: [phone] Fax: [phone]      Physical Therapy Treatment Note/ Progress Report:     Date:  2022    Patient Name:  Yumiko Moore    :  1960  MRN: [id number]  Restrictions/Precautions:    Medical/Treatment Diagnosis Information:  · Diagnosis: ,cervical stenosis, neck pain  · Treatment Diagnosis: cervical stenosis M48.02, neck pain H12.8  Insurance/Certification information:  PT Insurance Information: [place]  Physician Information:  Referring Practitioner: Dr Annemarie Carmichael of care signed (Y/N):     Date of Patient follow up with Physician:      Progress Report: []  Yes  [x]  No     Date Range for reporting period:  Beginnin2022  Ending:     Progress report due (10 Rx/or 30 days whichever is less):      Recertification due (POC duration/ or 90 days whichever is less): 2022     Visit # Insurance Allowable Auth Needed   13 [place] (yr) []Yes    [x]No     Pain level:  4-5/10     SUBJECTIVE:  Patient states that she went to MD and he put her back on gabapentin to start at night and then to increase during the day. Notes that he is also going to put an order in for an injection in her neck, but she hasn't heard anything more on this yet. Also, surgeon stated that she may need c6/7 fused next. Wanted to work on having neck loosened, but will hold off on further therapy until after she has her follow ups so she has additional visits to use.       OBJECTIVE:    Observation:    Test measurements:     MEASURES 2022:  CERV ROM       Cervical Flexion 35     Cervical Extension 25       Left Right   Cervical SB 30 25   Cervical rotation 45 42       INITIAL MEASURES:  CERV ROM       Cervical Flexion 15     Cervical Extension 5       Left Right   Cervical SB 10 15   Cervical rotation 15 15       RESTRICTIONS/PRECAUTIONS: ACDF C5/6 2022    Exercises/Interventions: Therapeutic Ex (51656)  Min: 21 Sets/sec Reps Notes   AROM c/spine rotation 0     Half kneel thoracic rotation stretch 0  each   T- band Row/pinch 1 15 green   T- band lower pinch 1 15 green   T-spine ext over roll      No money 1 15 green   Cervical retraction 5sec 10 Standing at wall   Cervical spine stepping isometrics with red band   Retro stepping and lateral stepping bilat   Quadruped w cerv retract 5sec 15    1st rib stretch 0  bilat   Upper cervical rotation stretch 30 2 bilat   Chin tuck 5'' 10 supine   Chin tuck w lift      Chin tuck w rotation ball on wall      Standing shoulder elevation :flexion, scaption and abd 0  2 lb   Prone scap squeeze 5'' 15 challenging   Manual Intervention (06965)  Min: 24      Cerv ROM 1 8 Grade II-III C2/3 and C3/4, C6/7 and C7/T1   Thoracic mobs/manip 1 4 Prone- inferior rib/upper Tspine ; PA mid Tspine   CT manip      Rib mobilizations 1 6 bilat 1st rib   STM 1 6 Cervical paraspinals, UT   DN            NMR re-education (29415)  Min:      T-spine Ext- foam roll      Chin tucks       No money      Wall Postural re-ed            Therapeutic Activity (28760)  Min:                        DN      ESTIM                Therapeutic Exercise and NMR EXR  [x] (17492) Provided verbal/tactile cueing for activities related to strengthening, flexibility, endurance, ROM  for improvements in cervical, postural, scapular, scapulothoracic and UE control with self care, reaching, carrying, lifting, house/yardwork, driving/computer work. [x] (37095) Provided verbal/tactile cueing for activities related to improving balance, coordination, kinesthetic sense, posture, motor skill, proprioception  to assist with cervical, scapular, scapulothoracic and UE control with self care, reaching, carrying, lifting, house/yardwork, driving/computer work.     Therapeutic Activities:    [] (65759 or ) Provided verbal/tactile cueing for activities related to improving balance, coordination, kinesthetic sense, posture, motor skill, proprioception and motor activation to allow for proper function of cervical, scapular, scapulothoracic and UE control with self care, carrying, lifting, driving/computer work.      Home Exercise Program:    [x] (93116) Reviewed/Progressed HEP activities related to strengthening, flexibility, endurance, ROM of cervical, scapular, scapulothoracic and UE control with self care, reaching, carrying, lifting, house/yardwork, driving/computer work  [] (40765) Reviewed/Progressed HEP activities related to improving balance, coordination, kinesthetic sense, posture, motor skill, proprioception of cervical, scapular, scapulothoracic and UE control with self care, reaching, carrying, lifting, house/yardwork, driving/computer work      Manual Treatments:  PROM / STM / Oscillations-Mobs:  G-I, II, III, IV (PA's, Inf., Post.)  [x] (79336) Provided manual therapy to mobilize soft tissue/joints of cervical/CT, scapular GHJ and UE for the purpose of decreasing headache, modulating pain, promoting relaxation,  increasing ROM, reducing/eliminating soft tissue swelling/inflammation/restriction, improving soft tissue extensibility and allowing for proper ROM for normal function with self care, reaching, carrying, lifting, house/yardwork, driving/computer work    Modalities:      Charges:  Timed Code Treatment Minutes: 45   Total Treatment Minutes: 45     [] EVAL (LOW) 31253 (typically 20 minutes face-to-face)  [] EVAL (MOD) 78537 (typically 30 minutes face-to-face)  [] EVAL (HIGH) 79873 (typically 45 minutes face-to-face)  [] RE-EVAL     [x] IZ(09938) x 1   [] Dry needle 1 or 2 Muscles (55450)  [] NMR (25027) x     [] Dry needle 3+ Muscles (05779)  [x] Manual (10633) x   2    [] TA (05143) x     [] Mech Traction (62735)  [] ES(attended) (53022)     [] ES (un) (94140):   [] VASO (38546)  [] Other:    If BWC Please Indicate Time In/Out  CPT Code Time in Time out GOALS:  Patient stated goal: improve AROM to allow safe driving  [] Progressing: [x] Met: [] Not Met: [] Adjusted    Therapist goals for Patient:   Short Term Goals: To be achieved in: 2 weeks  1. Independent in HEP and progression per patient tolerance, in order to prevent re-injury. [] Progressing: [x] Met: [] Not Met: [] Adjusted  2. Patient will have a decrease in pain to facilitate improvement in movement, function, and ADLs as indicated by Functional Deficits. [x] Progressing: [] Met: [] Not Met: [] Adjusted    Long Term Goals: To be achieved in: 4 weeks  1. Disability index score of 42 or better with Foto FS and less than 40% disability for NDI to assist with reaching prior level of function. [x] Progressing: [] Met: [] Not Met: [] Adjusted  2. Patient will demonstrate [facility] of cervical/thoracic spine to allow for proper joint functioning as indicated by patients Functional Deficits. [x] Progressing: [] Met: [] Not Met: [] Adjusted  3. Patient will demonstrate an increase in postural awareness and control and activation of  Deep cervical stabilizers to allow for proper functional mobility as indicated by patients Functional Deficits. [x] Progressing: [] Met: [] Not Met: [] Adjusted  4. Patient will return to lifting/reaching functional activities without increased symptoms or restriction. [x] Progressing: [] Met: [] Not Met: [] Adjusted  5. Patient will report being able to sleep at least 6 hours and drive without increased symptoms or restriction. (patient specific functional goal)    [x] Progressing: [] Met: [] Not Met: [] Adjusted    ASSESSMENT: Patient with continued restriction in B UT, R>L. Still very tight and restricted in  upper cervical spine, C2-C4, as well as t/spien and 1st rib. Patient tolerated all soft tissue and mobilization with good improvement in ROM. Continues to be limited in posterior strength with cervical retraction and periscapular activation.  Patient challenged with co-contraction of cervical and periscapular musculature. Will hold on visits until patient has further follow up with MD due to insurance limitations. Treatment/Activity Tolerance:  [x] Patient tolerated treatment well [] Patient limited by fatique  [] Patient limited by pain  [] Patient limited by other medical complications  [] Other:     Overall Progression Towards Functional goals/ Treatment Progress Update:  [x] Patient is progressing as expected towards functional goals listed. [] Progression is slowed due to complexities/Impairments listed. [] Progression has been slowed due to co-morbidities. [] Plan just implemented, too soon to assess goals progression <30days   [] Goals require adjustment due to lack of progress  [] Patient is not progressing as expected and requires additional follow up with physician  [] Other    Prognosis for POC: [x] Good [] Fair  [] Poor    Patient requires continued skilled intervention: [x] Yes  [] No        PLAN:   [x] Continue per plan of care [] Alter current plan (see comments)  [] Plan of care initiated [] Hold pending MD visit [] Discharge    Electronically signed by: Tierra Sullivan PT    Note: If patient does not return for scheduled/recommended follow up visits, this note will serve as a discharge from care along with the most recent update on progress.

## 2022-12-06 ENCOUNTER — HOSPITAL ENCOUNTER (OUTPATIENT)
Dept: PHYSICAL THERAPY | Age: 62
Setting detail: THERAPIES SERIES
Discharge: HOME | End: 2022-12-06
Payer: COMMERCIAL

## 2022-12-06 PROCEDURE — 97140 MANUAL THERAPY 1/> REGIONS: CPT

## 2022-12-06 PROCEDURE — 97164 PT RE-EVAL EST PLAN CARE: CPT

## 2022-12-06 NOTE — PLAN OF CARE
[place] [address]  Phone: [phone] Fax: [phone]        Physical Therapy Re-Certification Plan of Nanda Gilmore      Dear Dr Tobin Rodriguez  ,    We had the pleasure of treating the following patient for physical therapy services at [address] [place] [address]. A summary of our findings can be found in the updated assessment below. This includes our plan of care. If you have any questions or concerns regarding these findings, please do not hesitate to contact me at the office phone number checked above.   Thank you for the referral.     Physician Signature:________________________________Date:__________________  By signing above (or electronic signature), therapists plan is approved by physician    Date Range Of Visits: 2022 thru 2022, returns to PT today 2022  Total Visits to Date: 1 (13 earlier in year)  Overall Response to Treatment:   [x]Patient is responding well to treatment and improvement is noted with regards  to goals   []Patient should continue to improve in reasonable time if they continue HEP   []Patient has plateaued and is no longer responding to skilled PT intervention    []Patient is getting worse and would benefit from return to referring MD   []Patient unable to adhere to initial POC   []Other:       Physical Therapy Treatment Note/ Progress Report:     Date:  2022    Patient Name:  Elizabeth Quach    :  1960  MRN: [id number]  Restrictions/Precautions:    Medical/Treatment Diagnosis Information:  Diagnosis: ,cervical stenosis, neck pain  Treatment Diagnosis: cervical stenosis M48.02, neck pain Z60.5  Insurance/Certification information:  PT Insurance Information: [place]  Physician Information:  Referring Practitioner: Dr Hu Colmenares of care signed (Y/N):     Date of Patient follow up with Physician:      Progress Report: [x]  Yes  []  No     Date Range for reporting period:  Beginnin2022  Ending:     Progress report due (10 Rx/or 30 days whichever is less):      Recertification due (POC duration/ or 90 days whichever is less):      Visit # Insurance Allowable Auth Needed   1 (13 earlier this year) [place] (20/yr) []Yes    [x]No     Pain level:  0-6/10 neck, sleeping 8/10 (wakes her 2-3x at night), when she is using arms will feel 5/10     SUBJECTIVE:  Patient states that she went back to see MD in  and he did MRI, CT scan and EMG. Found that fusion was intact but only fused around the outside and not the inside yet. Has 2 spurs above and below her level of fusion. States that he said he can fuse the other levels, but then she will have 50% less mobility. States that EMG was normal. Had injection on L side of neck in August and will be having another one soon (Dec). Reports that she feels pain in neck and also has stocking glove distribution of pain/burning/throbbing from elbows to hands. At times will have tingling into palms of both hands (randomly). Notes that she gets very stiff, very easily. Is limited in driving, lifting objects over head, vacuum, pushing grocery cart, or any vibratory motion. Denies any change in temperature change in hands. Feels like it is a pulse in her hands.  MD put in order for her to see a rheumatologist.        OBJECTIVE:   Observation: (2022) Foto 37, NDI 58.6% disability  Test measurements:    MEASURES 2022  CERV ROM       Cervical Flexion 15     Cervical Extension 18       Left Right   Cervical SB 20 25   Cervical rotation 34 30      MEASURES 2022:  CERV ROM       Cervical Flexion 35     Cervical Extension 25       Left Right   Cervical SB 30 25   Cervical rotation 45 42       INITIAL MEASURES:  CERV ROM       Cervical Flexion 15     Cervical Extension 5       Left Right   Cervical SB 10 15   Cervical rotation 15 15       RESTRICTIONS/PRECAUTIONS: ACDF C5/6 2022    Exercises/Interventions:   Therapeutic Ex (32046)  Min: 0 Sets/sec Reps Notes   AROM c/spine rotation 0     Half kneel thoracic rotation stretch 0  each   T- band Row/pinch 1 15 green   T- band lower pinch 1 15 green   T-spine ext over roll      No money 1 15 green   Cervical retraction 5sec 10 Standing at wall   Cervical spine stepping isometrics with red band   Retro stepping and lateral stepping bilat   Quadruped w cerv retract 5sec 15    1st rib stretch 0  bilat   Upper cervical rotation stretch 30 2 bilat   Chin tuck 5'' 10 supine   Chin tuck w lift      Chin tuck w rotation ball on wall      Standing shoulder elevation :flexion, scaption and abd 0  2 lb   Prone scap squeeze 5'' 15 challenging   Manual Intervention (87631)  Min: 18      Cerv ROM 1 6 Grade II-III C2/3 and C3/4, C6/7 and C7/T1   Thoracic mobs/manip 1 0 Prone- inferior rib/upper Tspine ; PA mid Tspine   CT manip      Rib mobilizations 1 6 bilat 1st rib   STM 1 6 Cervical paraspinals, UT   DN            NMR re-education (47644)  Min:      T-spine Ext- foam roll      Chin tucks       No money      Wall Postural re-ed            Therapeutic Activity (20077)  Min:                        DN      ESTIM              Therapeutic Exercise and NMR EXR  [x] (73513) Provided verbal/tactile cueing for activities related to strengthening, flexibility, endurance, ROM  for improvements in cervical, postural, scapular, scapulothoracic and UE control with self care, reaching, carrying, lifting, house/yardwork, driving/computer work. [x] (62773) Provided verbal/tactile cueing for activities related to improving balance, coordination, kinesthetic sense, posture, motor skill, proprioception  to assist with cervical, scapular, scapulothoracic and UE control with self care, reaching, carrying, lifting, house/yardwork, driving/computer work.     Therapeutic Activities:    [] (44383 or 75660) Provided verbal/tactile cueing for activities related to improving balance, coordination, kinesthetic sense, posture, motor skill, proprioception and motor activation to allow for proper function of cervical, scapular, scapulothoracic and UE control with self care, carrying, lifting, driving/computer work.      Home Exercise Program:    [x] (18508) Reviewed/Progressed HEP activities related to strengthening, flexibility, endurance, ROM of cervical, scapular, scapulothoracic and UE control with self care, reaching, carrying, lifting, house/yardwork, driving/computer work  [] (00540) Reviewed/Progressed HEP activities related to improving balance, coordination, kinesthetic sense, posture, motor skill, proprioception of cervical, scapular, scapulothoracic and UE control with self care, reaching, carrying, lifting, house/yardwork, driving/computer work      Manual Treatments:  PROM / STM / Oscillations-Mobs:  G-I, II, III, IV (PA's, Inf., Post.)  [x] (34700) Provided manual therapy to mobilize soft tissue/joints of cervical/CT, scapular GHJ and UE for the purpose of decreasing headache, modulating pain, promoting relaxation,  increasing ROM, reducing/eliminating soft tissue swelling/inflammation/restriction, improving soft tissue extensibility and allowing for proper ROM for normal function with self care, reaching, carrying, lifting, house/yardwork, driving/computer work    Modalities:      Charges:  Timed Code Treatment Minutes: 18   Total Treatment Minutes: 40     [] EVAL (LOW) 75542 (typically 20 minutes face-to-face)  [] EVAL (MOD) 84594 (typically 30 minutes face-to-face)  [] EVAL (HIGH) 61646 (typically 45 minutes face-to-face)  [x] RE-EVAL     [] VS(99245) x    [] Dry needle 1 or 2 Muscles (09281)  [] NMR (88253) x     [] Dry needle 3+ Muscles (68215)  [x] Manual (34451) x   1    [] TA (85428) x     [] Mech Traction (77056)  [] ES(attended) (73444)     [] ES (un) (59668):   [] VASO (94111)  [] Other:    If BWC Please Indicate Time In/Out  CPT Code Time in Time out                                     GOALS:  Patient stated goal: improve AROM to allow safe driving  [x] Progressing: [] Met: [] Not Met: [] Adjusted    Therapist goals for Patient:   Short Term Goals: To be achieved in: 2 weeks  1. Independent in HEP and progression per patient tolerance, in order to prevent re-injury. [x] Progressing: [] Met: [] Not Met: [] Adjusted  2. Patient will have a decrease in pain to facilitate improvement in movement, function, and ADLs as indicated by Functional Deficits. [x] Progressing: [] Met: [] Not Met: [] Adjusted    Long Term Goals: To be achieved in: 4 weeks  1. Disability index score of 42 or better with Foto FS and less than 40% disability for NDI to assist with reaching prior level of function. [x] Progressing: [] Met: [] Not Met: [] Adjusted  2. Patient will demonstrate increased AROM to [facility] of cervical/thoracic spine to allow for proper joint functioning as indicated by patients Functional Deficits. [x] Progressing: [] Met: [] Not Met: [] Adjusted  3. Patient will demonstrate an increase in postural awareness and control and activation of  Deep cervical stabilizers to allow for proper functional mobility as indicated by patients Functional Deficits. [x] Progressing: [] Met: [] Not Met: [] Adjusted  4. Patient will return to lifting/reaching functional activities without increased symptoms or restriction. [x] Progressing: [] Met: [] Not Met: [] Adjusted  5. Patient will report being able to sleep at least 6 hours and drive without increased symptoms or restriction. (patient specific functional goal)    [x] Progressing: [] Met: [] Not Met: [] Adjusted    ASSESSMENT: Patient returns to PT to further address c/spine pain and bilateral arm pain. Upon assessment, patient with significant limitation with ROM and very tight and restricted in upper cervical spine, C2-C4, as well as t/spine and 1st rib. Patient tolerated all soft tissue and mobilization with good improvement in ROM.  Continues to be limited in posterior strength with cervical retraction and periscapular activation. Patient challenged with co-contraction of cervical and periscapular musculature. Will benefit from further skilled PT services to address noted deficits. Treatment/Activity Tolerance:  [x] Patient tolerated treatment well [] Patient limited by fatique  [] Patient limited by pain  [] Patient limited by other medical complications  [] Other:     Overall Progression Towards Functional goals/ Treatment Progress Update:  [x] Patient is progressing as expected towards functional goals listed. [] Progression is slowed due to complexities/Impairments listed. [] Progression has been slowed due to co-morbidities. [] Plan just implemented, too soon to assess goals progression <30days   [] Goals require adjustment due to lack of progress  [] Patient is not progressing as expected and requires additional follow up with physician  [] Other    Prognosis for POC: [] Good [x] Fair  [] Poor    Patient requires continued skilled intervention: [x] Yes  [] No        PLAN:   [x] Continue per plan of care [] Alter current plan (see comments)  [] Plan of care initiated [] Hold pending MD visit [] Discharge    Electronically signed by: Kathy Aguayo PT    Note: If patient does not return for scheduled/recommended follow up visits, this note will serve as a discharge from care along with the most recent update on progress.

## 2022-12-09 ENCOUNTER — HOSPITAL ENCOUNTER (OUTPATIENT)
Dept: PHYSICAL THERAPY | Age: 62
Setting detail: THERAPIES SERIES
Discharge: HOME | End: 2022-12-09
Payer: COMMERCIAL

## 2022-12-09 PROCEDURE — 97140 MANUAL THERAPY 1/> REGIONS: CPT

## 2022-12-09 PROCEDURE — 97110 THERAPEUTIC EXERCISES: CPT

## 2022-12-09 NOTE — FLOWSHEET NOTE
[place] [address] [facility] [address]  Phone: [phone] Fax: [phone]            Physical Therapy Treatment Note/ Progress Report:     Date:  2022    Patient Name:  Peggy Clarke    :  1960  MRN: [id number]  Restrictions/Precautions:    Medical/Treatment Diagnosis Information:  Diagnosis: ,cervical stenosis, neck pain  Treatment Diagnosis: cervical stenosis M48.02, neck pain A41.1  Insurance/Certification information:  PT Insurance Information: Nichole  Physician Information:  Referring Practitioner: Dr Fabby Stack of care signed (Y/N):     Date of Patient follow up with Physician:      Progress Report: [x]  Yes  []  No     Date Range for reporting period:  Beginnin2022  Ending:     Progress report due (10 Rx/or 30 days whichever is less):      Recertification due (POC duration/ or 90 days whichever is less):      Visit # Insurance Allowable Auth Needed   2 (13 earlier this year) Nichole (20/yr) []Yes    [x]No     Pain level:  0-6/10 neck, sleeping 8/10 (wakes her 2-3x at night), when she is using arms will feel 5/10     SUBJECTIVE:  Patient states that felt good after last session. Had increased tightness and stiffness, along with pain yesterday. Still notes that she has some N/T feeling with certain arm movements.          OBJECTIVE:   Observation: (2022) Foto 37, NDI 58.6% disability  Test measurements:    MEASURES 2022  CERV ROM       Cervical Flexion 15     Cervical Extension 18       Left Right   Cervical SB 20 25   Cervical rotation 34 30      MEASURES 2022:  CERV ROM       Cervical Flexion 35     Cervical Extension 25       Left Right   Cervical SB 30 25   Cervical rotation 45 42       INITIAL MEASURES:  CERV ROM       Cervical Flexion 15     Cervical Extension 5       Left Right   Cervical SB 10 15   Cervical rotation 15 15       RESTRICTIONS/PRECAUTIONS: ACDF C5/6 2/1/2022    Exercises/Interventions:   Therapeutic Ex (06884)  Min: 10 Sets/sec Reps Notes   AROM c/spine rotation 0     Half kneel thoracic rotation stretch 0  each   T- band Row/pinch 1 15 green   T- band lower pinch 1 15 green   T-spine ext over roll      No money 1 15 green   Cervical retraction 5sec 10 Standing at wall   Cervical spine stepping isometrics with red band   Retro stepping and lateral stepping bilat   Quadruped w cerv retract 5sec 15    1st rib stretch 0  bilat   Upper cervical rotation stretch 30 2 bilat   Chin tuck 5'' 10 supine   Nerve gliding- ulnar, median and radial 30 4 Instructing self nerve glide mobilization for all distal nerves         Standing shoulder elevation :flexion, scaption and abd 0  2 lb   Prone scap squeeze 5'' 15 challenging   Manual Intervention (48642)  Min: 30      Cerv ROM 1 6 Grade II-III C2/3 and C3/4, C6/7 and C7/T1   Thoracic mobs/manip 1 0 Prone- inferior rib/upper Tspine ; PA mid Tspine   CT manip      Rib mobilizations 1 6 bilat 1st rib   STM 1 12 Cervical paraspinals, UT   Manual nerve gliding- median nerve 1 6min 3 min each UE         NMR re-education (03373)  Min:      T-spine Ext- foam roll      Chin tucks       No money      Wall Postural re-ed            Therapeutic Activity (44812)  Min:                        DN      ESTIM              Therapeutic Exercise and NMR EXR  [x] (51991) Provided verbal/tactile cueing for activities related to strengthening, flexibility, endurance, ROM  for improvements in cervical, postural, scapular, scapulothoracic and UE control with self care, reaching, carrying, lifting, house/yardwork, driving/computer work.     [x] (18073) Provided verbal/tactile cueing for activities related to improving balance, coordination, kinesthetic sense, posture, motor skill, proprioception  to assist with cervical, scapular, scapulothoracic and UE control with self care, reaching, carrying, lifting, house/yardwork, driving/computer work.    Therapeutic Activities:    [] (84419 or 43769) Provided verbal/tactile cueing for activities related to improving balance, coordination, kinesthetic sense, posture, motor skill, proprioception and motor activation to allow for proper function of cervical, scapular, scapulothoracic and UE control with self care, carrying, lifting, driving/computer work.      Home Exercise Program:    [x] (74897) Reviewed/Progressed HEP activities related to strengthening, flexibility, endurance, ROM of cervical, scapular, scapulothoracic and UE control with self care, reaching, carrying, lifting, house/yardwork, driving/computer work  [] (60748) Reviewed/Progressed HEP activities related to improving balance, coordination, kinesthetic sense, posture, motor skill, proprioception of cervical, scapular, scapulothoracic and UE control with self care, reaching, carrying, lifting, house/yardwork, driving/computer work      Manual Treatments:  PROM / STM / Oscillations-Mobs:  G-I, II, III, IV (PA's, Inf., Post.)  [x] (66527) Provided manual therapy to mobilize soft tissue/joints of cervical/CT, scapular GHJ and UE for the purpose of decreasing headache, modulating pain, promoting relaxation,  increasing ROM, reducing/eliminating soft tissue swelling/inflammation/restriction, improving soft tissue extensibility and allowing for proper ROM for normal function with self care, reaching, carrying, lifting, house/yardwork, driving/computer work    Modalities:      Charges:  Timed Code Treatment Minutes: 40   Total Treatment Minutes: 40     [] EVAL (LOW) 23639 (typically 20 minutes face-to-face)  [] EVAL (MOD) 59432 (typically 30 minutes face-to-face)  [] EVAL (HIGH) 24893 (typically 45 minutes face-to-face)  [] RE-EVAL     [x] GN(72594) x  1  [] Dry needle 1 or 2 Muscles (15590)  [] NMR (05705) x     [] Dry needle 3+ Muscles (94922)  [x] Manual (99771) x   2    [] TA (78114) x     [] Mech Traction (20345)  [] ES(attended) (20468)     [] ES (un) (39572):   [] VASO (25556)  [] Other:    If BW Please Indicate Time In/Out  CPT Code Time in Time out                                     GOALS:  Patient stated goal: improve AROM to allow safe driving  [x] Progressing: [] Met: [] Not Met: [] Adjusted    Therapist goals for Patient:   Short Term Goals: To be achieved in: 2 weeks  1. Independent in HEP and progression per patient tolerance, in order to prevent re-injury. [x] Progressing: [] Met: [] Not Met: [] Adjusted  2. Patient will have a decrease in pain to facilitate improvement in movement, function, and ADLs as indicated by Functional Deficits. [x] Progressing: [] Met: [] Not Met: [] Adjusted    Long Term Goals: To be achieved in: 4 weeks  1. Disability index score of 42 or better with Foto FS and less than 40% disability for NDI to assist with reaching prior level of function. [x] Progressing: [] Met: [] Not Met: [] Adjusted  2. Patient will demonstrate increased AROM to [facility] of cervical/thoracic spine to allow for proper joint functioning as indicated by patients Functional Deficits. [x] Progressing: [] Met: [] Not Met: [] Adjusted  3. Patient will demonstrate an increase in postural awareness and control and activation of  Deep cervical stabilizers to allow for proper functional mobility as indicated by patients Functional Deficits. [x] Progressing: [] Met: [] Not Met: [] Adjusted  4. Patient will return to lifting/reaching functional activities without increased symptoms or restriction. [x] Progressing: [] Met: [] Not Met: [] Adjusted  5. Patient will report being able to sleep at least 6 hours and drive without increased symptoms or restriction. (patient specific functional goal)    [x] Progressing: [] Met: [] Not Met: [] Adjusted    ASSESSMENT: Patient continues with tightness along bilat UT and scalenes. Good reduction in restriction with manual therapy.  Patient with considerable limitation in neural extensibility in ulnar and median distribution. Added nerve glides to HEP. Good overall tolerance to session today with good improvement in discomfort. Treatment/Activity Tolerance:  [x] Patient tolerated treatment well [] Patient limited by fatique  [] Patient limited by pain  [] Patient limited by other medical complications  [] Other:     Overall Progression Towards Functional goals/ Treatment Progress Update:  [x] Patient is progressing as expected towards functional goals listed. [] Progression is slowed due to complexities/Impairments listed. [] Progression has been slowed due to co-morbidities. [] Plan just implemented, too soon to assess goals progression <30days   [] Goals require adjustment due to lack of progress  [] Patient is not progressing as expected and requires additional follow up with physician  [] Other    Prognosis for POC: [] Good [x] Fair  [] Poor    Patient requires continued skilled intervention: [x] Yes  [] No        PLAN:   [x] Continue per plan of care [] Alter current plan (see comments)  [] Plan of care initiated [] Hold pending MD visit [] Discharge    Electronically signed by: Barbara Mazariegos PT    Note: If patient does not return for scheduled/recommended follow up visits, this note will serve as a discharge from care along with the most recent update on progress.

## 2022-12-12 ENCOUNTER — HOSPITAL ENCOUNTER (OUTPATIENT)
Dept: PHYSICAL THERAPY | Age: 62
Setting detail: THERAPIES SERIES
Discharge: HOME | End: 2022-12-12
Payer: COMMERCIAL

## 2022-12-12 PROCEDURE — 97140 MANUAL THERAPY 1/> REGIONS: CPT

## 2022-12-12 PROCEDURE — 97110 THERAPEUTIC EXERCISES: CPT

## 2022-12-12 NOTE — FLOWSHEET NOTE
[place] [address] [facility]  Phone: [phone] Fax: [phone]            Physical Therapy Treatment Note/ Progress Report:     Date:  2022    Patient Name:  Jennyfer Kim    :  1960  MRN: [id number]  Restrictions/Precautions:    Medical/Treatment Diagnosis Information:  Diagnosis: ,cervical stenosis, neck pain  Treatment Diagnosis: cervical stenosis M48.02, neck pain O64.3  Insurance/Certification information:  PT Insurance Information: Nichole  Physician Information:  Referring Practitioner: Dr Spain Service of care signed (Y/N):     Date of Patient follow up with Physician:      Progress Report: [x]  Yes  []  No     Date Range for reporting period:  Beginnin2022  Ending:     Progress report due (10 Rx/or 30 days whichever is less):      Recertification due (POC duration/ or 90 days whichever is less):      Visit # Insurance Allowable Auth Needed   3 (13 earlier this year) Nichole (20/yr) []Yes    [x]No     Pain level:  0-6/10 neck, sleeping 8/10 (wakes her 2-3x at night), when she is using arms will feel 5/10     SUBJECTIVE:  Patient states that neck was really sore after last session. Had continued discomfort with nerve glides, but was only able to do them a few times over the weekend. Feeling very stiff today.           OBJECTIVE:   Observation: (2022) Foto 37, NDI 58.6% disability  Test measurements: Discussed setting timer during work day as reminder to move/change positions and as a posture check    MEASURES 2022  CERV ROM       Cervical Flexion 15     Cervical Extension 18       Left Right   Cervical SB 20 25   Cervical rotation 34 30      MEASURES 2022:  CERV ROM       Cervical Flexion 35     Cervical Extension 25       Left Right   Cervical SB 30 25   Cervical rotation 45 42       INITIAL MEASURES:  CERV ROM       Cervical Flexion 15     Cervical Extension 5       Left Right   Cervical SB 10 15   Cervical rotation 15 15       RESTRICTIONS/PRECAUTIONS: ACDF C5/6 2/1/2022    Exercises/Interventions:   Therapeutic Ex (38698)  Min: 10 Sets/sec Reps Notes   AROM c/spine rotation 0     Half kneel thoracic rotation stretch 0  each   T- band Row/pinch 1 15 green   T- band lower pinch 1 15 green   T-spine ext over roll      No money 1 15 green   Cervical retraction 5sec 10 Standing at wall   Cervical spine stepping isometrics with red band   Retro stepping and lateral stepping bilat   Quadruped w cerv retract 5sec 15    1st rib stretch 0  bilat   Upper cervical rotation stretch 30 2 bilat   Chin tuck 5'' 10 supine   Nerve gliding- ulnar, median and radial 30 4 Instructing self nerve glide mobilization for all distal nerves         Standing shoulder elevation :flexion, scaption and abd 0  2 lb   Prone scap squeeze 5'' 15 challenging   Manual Intervention (22108)  Min: 32      Cerv ROM 1 6 Grade II-III C2/3 and C3/4, C6/7 and C7/T1   Thoracic mobs/manip 1 4 Prone- inferior rib/upper Tspine ; PA mid Tspine   CT manip      Rib mobilizations 1 4 bilat 1st rib   STM 1 12 Cervical paraspinals, UT   Manual nerve gliding- median nerve 1 6min 3 min each UE         NMR re-education (87585)  Min:      T-spine Ext- foam roll      Chin tucks       No money      Wall Postural re-ed            Therapeutic Activity (41085)  Min:                        DN      ESTIM              Therapeutic Exercise and NMR EXR  [x] (81932) Provided verbal/tactile cueing for activities related to strengthening, flexibility, endurance, ROM  for improvements in cervical, postural, scapular, scapulothoracic and UE control with self care, reaching, carrying, lifting, house/yardwork, driving/computer work.     [x] (55304) Provided verbal/tactile cueing for activities related to improving balance, coordination, kinesthetic sense, posture, motor skill, proprioception  to assist with cervical, scapular, scapulothoracic and UE control with self care, reaching, carrying, lifting, house/yardwork, driving/computer work. Therapeutic Activities:    [] (14552 or 45748) Provided verbal/tactile cueing for activities related to improving balance, coordination, kinesthetic sense, posture, motor skill, proprioception and motor activation to allow for proper function of cervical, scapular, scapulothoracic and UE control with self care, carrying, lifting, driving/computer work.      Home Exercise Program:    [x] (85970) Reviewed/Progressed HEP activities related to strengthening, flexibility, endurance, ROM of cervical, scapular, scapulothoracic and UE control with self care, reaching, carrying, lifting, house/yardwork, driving/computer work  [] (28663) Reviewed/Progressed HEP activities related to improving balance, coordination, kinesthetic sense, posture, motor skill, proprioception of cervical, scapular, scapulothoracic and UE control with self care, reaching, carrying, lifting, house/yardwork, driving/computer work      Manual Treatments:  PROM / STM / Oscillations-Mobs:  G-I, II, III, IV (PA's, Inf., Post.)  [x] (92664) Provided manual therapy to mobilize soft tissue/joints of cervical/CT, scapular GHJ and UE for the purpose of decreasing headache, modulating pain, promoting relaxation,  increasing ROM, reducing/eliminating soft tissue swelling/inflammation/restriction, improving soft tissue extensibility and allowing for proper ROM for normal function with self care, reaching, carrying, lifting, house/yardwork, driving/computer work    Modalities:      Charges:  Timed Code Treatment Minutes: 42   Total Treatment Minutes: 43     [] EVAL (LOW) 04600 (typically 20 minutes face-to-face)  [] EVAL (MOD) 44741 (typically 30 minutes face-to-face)  [] EVAL (HIGH) 46705 (typically 45 minutes face-to-face)  [] RE-EVAL     [x] FQ(79267) x  1  [] Dry needle 1 or 2 Muscles (19785)  [] NMR (31298) x     [] Dry needle 3+ Muscles (61612)  [x] Manual (35354) x   2    [] TA (13006) x [] [facility] Traction (42758)  [] ES(attended) (24151)     [] ES (un) (88870):   [] VASO (44797)  [] Other:    If Health system Please Indicate Time In/Out  CPT Code Time in Time out                                     GOALS:  Patient stated goal: improve AROM to allow safe driving  [x] Progressing: [] Met: [] Not Met: [] Adjusted    Therapist goals for Patient:   Short Term Goals: To be achieved in: 2 weeks  1. Independent in HEP and progression per patient tolerance, in order to prevent re-injury. [x] Progressing: [] Met: [] Not Met: [] Adjusted  2. Patient will have a decrease in pain to facilitate improvement in movement, function, and ADLs as indicated by Functional Deficits. [x] Progressing: [] Met: [] Not Met: [] Adjusted    Long Term Goals: To be achieved in: 4 weeks  1. Disability index score of 42 or better with Foto FS and less than 40% disability for NDI to assist with reaching prior level of function. [x] Progressing: [] Met: [] Not Met: [] Adjusted  2. Patient will demonstrate increased AROM to [facility] of cervical/thoracic spine to allow for proper joint functioning as indicated by patients Functional Deficits. [x] Progressing: [] Met: [] Not Met: [] Adjusted  3. Patient will demonstrate an increase in postural awareness and control and activation of  Deep cervical stabilizers to allow for proper functional mobility as indicated by patients Functional Deficits. [x] Progressing: [] Met: [] Not Met: [] Adjusted  4. Patient will return to lifting/reaching functional activities without increased symptoms or restriction. [x] Progressing: [] Met: [] Not Met: [] Adjusted  5. Patient will report being able to sleep at least 6 hours and drive without increased symptoms or restriction. (patient specific functional goal)    [x] Progressing: [] Met: [] Not Met: [] Adjusted    ASSESSMENT: Patient continues with tightness along bilat UT and scalenes. Good reduction in restriction with manual therapy.  Tolerated joint mobilization at C2-4 and C7/T1, as well as T/spine. Patient continues with considerable limitation in neural extensibility in ulnar and median distribution, but able to stretch enough where pt just feeling tightness and not N/T. Worked further on periscapular activation and improved head posture with exercises. Feeling improved at conclusion of session. Treatment/Activity Tolerance:  [x] Patient tolerated treatment well [] Patient limited by fatique  [] Patient limited by pain  [] Patient limited by other medical complications  [] Other:     Overall Progression Towards Functional goals/ Treatment Progress Update:  [x] Patient is progressing as expected towards functional goals listed. [] Progression is slowed due to complexities/Impairments listed. [] Progression has been slowed due to co-morbidities. [] Plan just implemented, too soon to assess goals progression <30days   [] Goals require adjustment due to lack of progress  [] Patient is not progressing as expected and requires additional follow up with physician  [] Other    Prognosis for POC: [] Good [x] Fair  [] Poor    Patient requires continued skilled intervention: [x] Yes  [] No        PLAN:   [x] Continue per plan of care [] Alter current plan (see comments)  [] Plan of care initiated [] Hold pending MD visit [] Discharge    Electronically signed by: Kylie Golden PT    Note: If patient does not return for scheduled/recommended follow up visits, this note will serve as a discharge from care along with the most recent update on progress.

## 2022-12-22 ENCOUNTER — HOSPITAL ENCOUNTER (OUTPATIENT)
Dept: PHYSICAL THERAPY | Age: 62
Setting detail: THERAPIES SERIES
Discharge: HOME | End: 2022-12-22
Payer: COMMERCIAL

## 2022-12-22 PROCEDURE — 97140 MANUAL THERAPY 1/> REGIONS: CPT

## 2022-12-22 PROCEDURE — 97110 THERAPEUTIC EXERCISES: CPT

## 2022-12-22 NOTE — FLOWSHEET NOTE
[facility] [address] [facility]  Phone: [phone] Fax: [phone]            Physical Therapy Treatment Note/ Progress Report:     Date:  2022    Patient Name:  Bautista Rangel    :  1960  MRN: [id number]  Restrictions/Precautions:    Medical/Treatment Diagnosis Information:  Diagnosis: ,cervical stenosis, neck pain  Treatment Diagnosis: cervical stenosis M48.02, neck pain S43.4  Insurance/Certification information:  PT Insurance Information: Nichole  Physician Information:  Referring Practitioner: Dr Humberto Michael of care signed (Y/N):     Date of Patient follow up with Physician:      Progress Report: [x]  Yes  []  No     Date Range for reporting period:  Beginnin2022  Ending:     Progress report due (10 Rx/or 30 days whichever is less):      Recertification due (POC duration/ or 90 days whichever is less):      Visit # Insurance Allowable Auth Needed   4 (13 earlier this year) Nichole (20/yr) []Yes    [x]No     Pain level:  0-6/10 neck, sleeping 8/10 (wakes her 2-3x at night), when she is using arms will feel 5/10     SUBJECTIVE:  Patient states that neck wasn't as sore after last session. Does states that after she does nerve gliding that she has quite a lot of nerve pain for about 2-3 hours and just has to wait to get it to settle down. Otherwise, neck isn't feeling as tight as it was. OBJECTIVE:   Observation: (2022) Foto 37, NDI 58.6% disability  Test measurements: Discussed setting timer during work day as reminder to [employer] positions and as a posture check. Discussed backing off nerve glides to 50-75% of ROM to lessen stress on nerve, as well as using mirror for improved postural position and decreased overcompensation of UT.      MEASURES 2022  CERV ROM       Cervical Flexion 15     Cervical Extension 18       Left Right   Cervical SB 20 25   Cervical rotation 34 30      MEASURES 5/17/2022:  CERV ROM       Cervical Flexion 35     Cervical Extension 25       Left Right   Cervical SB 30 25   Cervical rotation 45 42       INITIAL MEASURES:  CERV ROM       Cervical Flexion 15     Cervical Extension 5       Left Right   Cervical SB 10 15   Cervical rotation 15 15       RESTRICTIONS/PRECAUTIONS: ACDF C5/6 2/1/2022    Exercises/Interventions:   Therapeutic Ex (53413)  Min: 10 Sets/sec Reps Notes   AROM c/spine rotation 0     Half kneel thoracic rotation stretch 0  each   T- band Row/pinch 1 15 green   T- band lower pinch 1 15 green   T-spine ext over roll      No money 1 15 green   Cervical retraction 5sec 10 Standing at wall   Cervical spine stepping isometrics with red band   Retro stepping and lateral stepping bilat   Quadruped w cerv retract 5sec 15    1st rib stretch 0  bilat   Upper cervical rotation stretch 30 2 bilat   Chin tuck 5'' 10 supine   Nerve gliding- ulnar, median and radial 30 4 Instructing self nerve glide mobilization for all distal nerves         Standing shoulder elevation :flexion, scaption and abd 0  2 lb   Prone scap squeeze 5'' 15 challenging   Manual Intervention (26895)  Min: 32      Cerv ROM 1 6 Grade II-III C2/3 and C3/4, C6/7 and C7/T1   Thoracic mobs/manip 1 4 Prone- inferior rib/upper Tspine ; PA mid Tspine   CT manip      Rib mobilizations 1 4 bilat 1st rib   STM 1 12 Cervical paraspinals, UT   Manual nerve gliding- median nerve 1 6min 3 min each UE         NMR re-education (12460)  Min:      T-spine Ext- foam roll      Chin tucks       No money      Wall Postural re-ed            Therapeutic Activity (52517)  Min:                        DN      ESTIM              Therapeutic Exercise and NMR EXR  [x] (33250) Provided verbal/tactile cueing for activities related to strengthening, flexibility, endurance, ROM  for improvements in cervical, postural, scapular, scapulothoracic and UE control with self care, reaching, carrying, lifting, house/yardwork, driving/computer work.    [x] (32967) Provided verbal/tactile cueing for activities related to improving balance, coordination, kinesthetic sense, posture, motor skill, proprioception  to assist with cervical, scapular, scapulothoracic and UE control with self care, reaching, carrying, lifting, house/yardwork, driving/computer work. Therapeutic Activities:    [] (54636 or 99656) Provided verbal/tactile cueing for activities related to improving balance, coordination, kinesthetic sense, posture, motor skill, proprioception and motor activation to allow for proper function of cervical, scapular, scapulothoracic and UE control with self care, carrying, lifting, driving/computer work.      Home Exercise Program:    [x] (49105) Reviewed/Progressed HEP activities related to strengthening, flexibility, endurance, ROM of cervical, scapular, scapulothoracic and UE control with self care, reaching, carrying, lifting, house/yardwork, driving/computer work  [] (06183) Reviewed/Progressed HEP activities related to improving balance, coordination, kinesthetic sense, posture, motor skill, proprioception of cervical, scapular, scapulothoracic and UE control with self care, reaching, carrying, lifting, house/yardwork, driving/computer work      Manual Treatments:  PROM / STM / Oscillations-Mobs:  G-I, II, III, IV (PA's, Inf., Post.)  [x] (60093) Provided manual therapy to mobilize soft tissue/joints of cervical/CT, scapular GHJ and UE for the purpose of decreasing headache, modulating pain, promoting relaxation,  increasing ROM, reducing/eliminating soft tissue swelling/inflammation/restriction, improving soft tissue extensibility and allowing for proper ROM for normal function with self care, reaching, carrying, lifting, house/yardwork, driving/computer work    Modalities:      Charges:  Timed Code Treatment Minutes: 42   Total Treatment Minutes: 43     [] EVAL (LOW) 20097 (typically 20 minutes face-to-face)  [] EVAL (MOD) 65096 (typically 30 minutes face-to-face)  [] EVAL (HIGH) 31885 (typically 45 minutes face-to-face)  [] RE-EVAL     [x] TG(57628) x  1  [] Dry needle 1 or 2 Muscles (57846)  [] NMR (91385) x     [] Dry needle 3+ Muscles (82714)  [x] Manual (63689) x   2    [] TA (45895) x     [] Mech Traction (13529)  [] ES(attended) (41709)     [] ES (un) (30128):   [] VASO (60531)  [] Other:    If BWC Please Indicate Time In/Out  CPT Code Time in Time out                                     GOALS:  Patient stated goal: improve AROM to allow safe driving  [x] Progressing: [] Met: [] Not Met: [] Adjusted    Therapist goals for Patient:   Short Term Goals: To be achieved in: 2 weeks  1. Independent in HEP and progression per patient tolerance, in order to prevent re-injury. [x] Progressing: [] Met: [] Not Met: [] Adjusted  2. Patient will have a decrease in pain to facilitate improvement in movement, function, and ADLs as indicated by Functional Deficits. [x] Progressing: [] Met: [] Not Met: [] Adjusted    Long Term Goals: To be achieved in: 4 weeks  1. Disability index score of 42 or better with Foto FS and less than 40% disability for NDI to assist with reaching prior level of function. [x] Progressing: [] Met: [] Not Met: [] Adjusted  2. Patient will demonstrate increased AROM to [facility] of cervical/thoracic spine to allow for proper joint functioning as indicated by patients Functional Deficits. [x] Progressing: [] Met: [] Not Met: [] Adjusted  3. Patient will demonstrate an increase in postural awareness and control and activation of  Deep cervical stabilizers to allow for proper functional mobility as indicated by patients Functional Deficits. [x] Progressing: [] Met: [] Not Met: [] Adjusted  4. Patient will return to lifting/reaching functional activities without increased symptoms or restriction. [x] Progressing: [] Met: [] Not Met: [] Adjusted  5.  Patient will report being able to sleep at least 6 hours and drive without increased symptoms or restriction. (patient specific functional goal)    [x] Progressing: [] Met: [] Not Met: [] Adjusted    ASSESSMENT: Patient continues with tightness along bilat UT and scalenes. Lessening reduction in cervical joint mobility. Improving neural extensibility, R UE less restriction than L. Worked further on periscapular activation and improved head posture with exercises. Feeling improved at conclusion of session. Treatment/Activity Tolerance:  [x] Patient tolerated treatment well [] Patient limited by fatique  [] Patient limited by pain  [] Patient limited by other medical complications  [] Other:     Overall Progression Towards Functional goals/ Treatment Progress Update:  [x] Patient is progressing as expected towards functional goals listed. [] Progression is slowed due to complexities/Impairments listed. [] Progression has been slowed due to co-morbidities. [] Plan just implemented, too soon to assess goals progression <30days   [] Goals require adjustment due to lack of progress  [] Patient is not progressing as expected and requires additional follow up with physician  [] Other    Prognosis for POC: [] Good [x] Fair  [] Poor    Patient requires continued skilled intervention: [x] Yes  [] No        PLAN:   [x] Continue per plan of care [] Alter current plan (see comments)  [] Plan of care initiated [] Hold pending MD visit [] Discharge    Electronically signed by: Stephen Stephens PT    Note: If patient does not return for scheduled/recommended follow up visits, this note will serve as a discharge from care along with the most recent update on progress.

## 2022-12-27 ENCOUNTER — HOSPITAL ENCOUNTER (OUTPATIENT)
Dept: PHYSICAL THERAPY | Age: 62
Setting detail: THERAPIES SERIES
Discharge: HOME | End: 2022-12-27
Payer: COMMERCIAL

## 2022-12-27 PROCEDURE — 97110 THERAPEUTIC EXERCISES: CPT

## 2022-12-27 PROCEDURE — 97140 MANUAL THERAPY 1/> REGIONS: CPT

## 2022-12-27 NOTE — FLOWSHEET NOTE
[facility] [address] [facility]  Phone: [phone] Fax: [phone]            Physical Therapy Treatment Note/ Progress Report:     Date:  2022    Patient Name:  Bautista Rangel    :  1960  MRN: [id number]  Restrictions/Precautions:    Medical/Treatment Diagnosis Information:  Diagnosis: ,cervical stenosis, neck pain  Treatment Diagnosis: cervical stenosis M48.02, neck pain E57.1  Insurance/Certification information:  PT Insurance Information: Nichole  Physician Information:  Referring Practitioner: Dr Humberto Michael of care signed (Y/N):     Date of Patient follow up with Physician:      Progress Report: [x]  Yes  []  No     Date Range for reporting period:  Beginnin2022  Ending:     Progress report due (10 Rx/or 30 days whichever is less):      Recertification due (POC duration/ or 90 days whichever is less):      Visit # Insurance Allowable Auth Needed   4 (13 earlier this year) Nichole (20/yr) []Yes    [x]No     Pain level:  0-6/10 neck, sleeping 8/10 (wakes her 2-3x at night), when she is using arms will feel 5/10     SUBJECTIVE:  Patient states that neck wasn't as sore after last session even with pushing motion more. .Still waking some at night, but not as bad. Having less overall nerve pain. Notes that adjustments to nerve glides was helping and didn't have as much pain after exercises as she had. Will be having injection tomorrow. OBJECTIVE:   Observation: (2022) Foto 37, NDI 58.6% disability  Test measurements: Discussed setting timer during work day as reminder to [employer] positions and as a posture check. Discussed backing off nerve glides to 50-75% of ROM to lessen stress on nerve, as well as using mirror for improved postural position and decreased overcompensation of UT.      MEASURES 2022  CERV ROM       Cervical Flexion 15     Cervical Extension 18       Left Right   Cervical SB 20 25 Cervical rotation 34 30      MEASURES 5/17/2022:  CERV ROM       Cervical Flexion 35     Cervical Extension 25       Left Right   Cervical SB 30 25   Cervical rotation 45 42       INITIAL MEASURES:  CERV ROM       Cervical Flexion 15     Cervical Extension 5       Left Right   Cervical SB 10 15   Cervical rotation 15 15       RESTRICTIONS/PRECAUTIONS: ACDF C5/6 2/1/2022    Exercises/Interventions:   Therapeutic Ex (91251)  Min: 20 Sets/sec Reps Notes   AROM c/spine rotation 0     Half kneel thoracic rotation stretch 0  each   T- band Row/pinch 1 15 green   T- band lower pinch 1 15 green   Cervical SB and rotation isometrics 5sec 10/ea bilat   No money 1 15 green   Cervical retraction 5sec 10 Standing at wall   Cervical spine stepping isometrics with red band   Retro stepping and lateral stepping bilat   Quadruped w cerv retract 5sec 15    1st rib stretch 0  bilat   Upper cervical rotation stretch 30 2 bilat   Chin tuck 5'' 10 supine   Nerve gliding- ulnar, median and radial 30 4 Instructing self nerve glide mobilization for all distal nerves         Standing shoulder elevation :flexion, scaption and abd 0  2 lb   Prone scap squeeze 5'' 15 challenging   Manual Intervention (31745)  Min: 25      Cerv ROM 1 6 Grade II-III C2/3 and C3/4, C6/7 and C7/T1   Thoracic mobs/manip 1 4 Prone- inferior rib/upper Tspine ; PA mid Tspine   CT manip      Rib mobilizations 1 3 bilat 1st rib   STM 1 12 Cervical paraspinals, UT   Manual nerve gliding- median nerve 1 0min 3 min each UE         NMR re-education (65680)  Min:      T-spine Ext- foam roll      Chin tucks       No money      Wall Postural re-ed            Therapeutic Activity (80306)  Min:                        DN      ESTIM              Therapeutic Exercise and NMR EXR  [x] (39794) Provided verbal/tactile cueing for activities related to strengthening, flexibility, endurance, ROM  for improvements in cervical, postural, scapular, scapulothoracic and UE control with self care, reaching, carrying, lifting, house/yardwork, driving/computer work. [x] (36366) Provided verbal/tactile cueing for activities related to improving balance, coordination, kinesthetic sense, posture, motor skill, proprioception  to assist with cervical, scapular, scapulothoracic and UE control with self care, reaching, carrying, lifting, house/yardwork, driving/computer work. Therapeutic Activities:    [] (14583 or 54565) Provided verbal/tactile cueing for activities related to improving balance, coordination, kinesthetic sense, posture, motor skill, proprioception and motor activation to allow for proper function of cervical, scapular, scapulothoracic and UE control with self care, carrying, lifting, driving/computer work.      Home Exercise Program:    [x] (24738) Reviewed/Progressed HEP activities related to strengthening, flexibility, endurance, ROM of cervical, scapular, scapulothoracic and UE control with self care, reaching, carrying, lifting, house/yardwork, driving/computer work  [] (96732) Reviewed/Progressed HEP activities related to improving balance, coordination, kinesthetic sense, posture, motor skill, proprioception of cervical, scapular, scapulothoracic and UE control with self care, reaching, carrying, lifting, house/yardwork, driving/computer work      Manual Treatments:  PROM / STM / Oscillations-Mobs:  G-I, II, III, IV (PA's, Inf., Post.)  [x] (62459) Provided manual therapy to mobilize soft tissue/joints of cervical/CT, scapular GHJ and UE for the purpose of decreasing headache, modulating pain, promoting relaxation,  increasing ROM, reducing/eliminating soft tissue swelling/inflammation/restriction, improving soft tissue extensibility and allowing for proper ROM for normal function with self care, reaching, carrying, lifting, house/yardwork, driving/computer work    Modalities:      Charges:  Timed Code Treatment Minutes: 45   Total Treatment Minutes: 45     [] SARAH (LOW) [id number] (typically 20 minutes face-to-face)  [] EVAL (MOD) 68684 (typically 30 minutes face-to-face)  [] EVAL (HIGH) 57404 (typically 45 minutes face-to-face)  [] RE-EVAL     [x] ZO(41235) x  1  [] Dry needle 1 or 2 Muscles (50782)  [] NMR (01071) x     [] Dry needle 3+ Muscles (02823)  [x] Manual (09597) x   2    [] TA (70447) x     [] Mech Traction (93604)  [] ES(attended) (64414)     [] ES (un) (80122):   [] VASO (20254)  [] Other:    If BWC Please Indicate Time In/Out  CPT Code Time in Time out                                     GOALS:  Patient stated goal: improve AROM to allow safe driving  [x] Progressing: [] Met: [] Not Met: [] Adjusted    Therapist goals for Patient:   Short Term Goals: To be achieved in: 2 weeks  1. Independent in HEP and progression per patient tolerance, in order to prevent re-injury. [x] Progressing: [] Met: [] Not Met: [] Adjusted  2. Patient will have a decrease in pain to facilitate improvement in movement, function, and ADLs as indicated by Functional Deficits. [x] Progressing: [] Met: [] Not Met: [] Adjusted    Long Term Goals: To be achieved in: 4 weeks  1. Disability index score of 42 or better with Foto FS and less than 40% disability for NDI to assist with reaching prior level of function. [x] Progressing: [] Met: [] Not Met: [] Adjusted  2. Patient will demonstrate increased AROM to [facility] of cervical/thoracic spine to allow for proper joint functioning as indicated by patients Functional Deficits. [x] Progressing: [] Met: [] Not Met: [] Adjusted  3. Patient will demonstrate an increase in postural awareness and control and activation of  Deep cervical stabilizers to allow for proper functional mobility as indicated by patients Functional Deficits. [x] Progressing: [] Met: [] Not Met: [] Adjusted  4. Patient will return to lifting/reaching functional activities without increased symptoms or restriction. [x] Progressing: [] Met: [] Not Met: [] Adjusted  5.  Patient will report being able to sleep at least 6 hours and drive without increased symptoms or restriction. (patient specific functional goal)    [x] Progressing: [] Met: [] Not Met: [] Adjusted    ASSESSMENT: Patient continues with tightness along bilat UT and scalenes. Lessening reduction in cervical joint mobility. Improving neural extensibility, R UE less restriction than L. Worked further on periscapular activation and improved head posture with exercises. Feeling improved at conclusion of session. Treatment/Activity Tolerance:  [x] Patient tolerated treatment well [] Patient limited by fatique  [] Patient limited by pain  [] Patient limited by other medical complications  [] Other:     Overall Progression Towards Functional goals/ Treatment Progress Update:  [x] Patient is progressing as expected towards functional goals listed. [] Progression is slowed due to complexities/Impairments listed. [] Progression has been slowed due to co-morbidities. [] Plan just implemented, too soon to assess goals progression <30days   [] Goals require adjustment due to lack of progress  [] Patient is not progressing as expected and requires additional follow up with physician  [] Other    Prognosis for POC: [] Good [x] Fair  [] Poor    Patient requires continued skilled intervention: [x] Yes  [] No        PLAN:   [x] Continue per plan of care [] Alter current plan (see comments)  [] Plan of care initiated [] Hold pending MD visit [] Discharge    Electronically signed by: Pat Samuel PT    Note: If patient does not return for scheduled/recommended follow up visits, this note will serve as a discharge from care along with the most recent update on progress.

## 2022-12-30 ENCOUNTER — HOSPITAL ENCOUNTER (OUTPATIENT)
Dept: PHYSICAL THERAPY | Age: 62
Setting detail: THERAPIES SERIES
Discharge: HOME | End: 2022-12-30
Payer: COMMERCIAL

## 2022-12-30 PROCEDURE — 97110 THERAPEUTIC EXERCISES: CPT

## 2022-12-30 PROCEDURE — 97140 MANUAL THERAPY 1/> REGIONS: CPT

## 2022-12-30 NOTE — FLOWSHEET NOTE
[place] [address] [facility]  Phone: [phone] Fax: [phone]            Physical Therapy Treatment Note/ Progress Report:     Date:  2022    Patient Name:  Ember Brown    :  1960  MRN: [id number]  Restrictions/Precautions:    Medical/Treatment Diagnosis Information:  Diagnosis: ,cervical stenosis, neck pain  Treatment Diagnosis: cervical stenosis M48.02, neck pain Y53.5  Insurance/Certification information:  PT Insurance Information: [employer]  Physician Information:  Referring Practitioner: Dr Prem Wynn of care signed (Y/N):     Date of Patient follow up with Physician:      Progress Report: [x]  Yes  []  No     Date Range for reporting period:  Beginnin2022  Ending:     Progress report due (10 Rx/or 30 days whichever is less):      Recertification due (POC duration/ or 90 days whichever is less):      Visit # Insurance Allowable Auth Needed   5 (13 earlier this year) Nichole (20/yr) []Yes    [x]No     Pain level:  0-4/10 neck     SUBJECTIVE:  Patient states that neck felt good after last session. States she had injection on R side of neck yesterday and is feeling much better. Thought she would do better with sleeping, but still woke up last night. Would like to work more IND since neck is feeling better since injection. Not having as much nerve pain and feels like she is able to stretch nerves more since injection as well. OBJECTIVE:   Observation: (2022) Foto 37, NDI 58.6% disability  Test measurements: Discussed setting timer during work day as reminder to [employer] positions and as a posture check. Discussed backing off nerve glides to 50-75% of ROM to lessen stress on nerve, as well as using mirror for improved postural position and decreased overcompensation of UT.      MEASURES 2022  CERV ROM       Cervical Flexion 15     Cervical Extension 18       Left Right   Cervical SB 20 25 Cervical rotation 34 30      MEASURES 5/17/2022:  CERV ROM       Cervical Flexion 35     Cervical Extension 25       Left Right   Cervical SB 30 25   Cervical rotation 45 42       INITIAL MEASURES:  CERV ROM       Cervical Flexion 15     Cervical Extension 5       Left Right   Cervical SB 10 15   Cervical rotation 15 15       RESTRICTIONS/PRECAUTIONS: ACDF C5/6 2/1/2022    Exercises/Interventions:   Therapeutic Ex (67731)  Min: 20 Sets/sec Reps Notes   AROM c/spine rotation 0     Half kneel thoracic rotation stretch 0  each   T- band Row/pinch 1 15 green   T- band lower pinch 1 15 green   Cervical SB and rotation isometrics 5sec 10/ea bilat   No money 1 15 green   Cervical retraction 5sec 10 Standing at wall   Cervical spine stepping isometrics with red band   Retro stepping and lateral stepping bilat   Quadruped w cerv retract 5sec 15    1st rib stretch 0  bilat   Upper cervical rotation stretch 30 2 bilat   Chin tuck 5'' 10 supine   Nerve gliding- ulnar, median and radial 30 4 Instructing self nerve glide mobilization for all distal nerves   Lateral wall taps 2 10 yellow   Prone scap squeeze + sh ext 2 10          Manual Intervention (64598)  Min: 25      Cerv ROM 1 6 Grade II-III C2/3 and C3/4, C6/7 and C7/T1   Thoracic mobs/manip 1 4 Prone- inferior rib/upper Tspine ; PA mid Tspine   CT manip      Rib mobilizations 1 3 bilat 1st rib   STM 1 12 Cervical paraspinals, UT   Manual nerve gliding- median nerve 1 0min 3 min each UE         NMR re-education (07994)  Min:      T-spine Ext- foam roll      Chin tucks       No money      Wall Postural re-ed            Therapeutic Activity (02744)  Min:                        DN      ESTIM              Therapeutic Exercise and NMR EXR  [x] (14721) Provided verbal/tactile cueing for activities related to strengthening, flexibility, endurance, ROM  for improvements in cervical, postural, scapular, scapulothoracic and UE control with self care, reaching, carrying, lifting, house/yardwork, driving/computer work. [x] (08933) Provided verbal/tactile cueing for activities related to improving balance, coordination, kinesthetic sense, posture, motor skill, proprioception  to assist with cervical, scapular, scapulothoracic and UE control with self care, reaching, carrying, lifting, house/yardwork, driving/computer work. Therapeutic Activities:    [] (59913 or 91218) Provided verbal/tactile cueing for activities related to improving balance, coordination, kinesthetic sense, posture, motor skill, proprioception and motor activation to allow for proper function of cervical, scapular, scapulothoracic and UE control with self care, carrying, lifting, driving/computer work.      Home Exercise Program:    [x] (41698) Reviewed/Progressed HEP activities related to strengthening, flexibility, endurance, ROM of cervical, scapular, scapulothoracic and UE control with self care, reaching, carrying, lifting, house/yardwork, driving/computer work  [] (44755) Reviewed/Progressed HEP activities related to improving balance, coordination, kinesthetic sense, posture, motor skill, proprioception of cervical, scapular, scapulothoracic and UE control with self care, reaching, carrying, lifting, house/yardwork, driving/computer work      Manual Treatments:  PROM / STM / Oscillations-Mobs:  G-I, II, III, IV (PA's, Inf., Post.)  [x] (72757) Provided manual therapy to mobilize soft tissue/joints of cervical/CT, scapular GHJ and UE for the purpose of decreasing headache, modulating pain, promoting relaxation,  increasing ROM, reducing/eliminating soft tissue swelling/inflammation/restriction, improving soft tissue extensibility and allowing for proper ROM for normal function with self care, reaching, carrying, lifting, house/yardwork, driving/computer work    Modalities:      Charges:  Timed Code Treatment Minutes: 45   Total Treatment Minutes: 45     [] SARAH (LOW) 54115 (typically 20 minutes face-to-face)  [] EVAL (MOD) 29879 (typically 30 minutes face-to-face)  [] EVAL (HIGH) 89735 (typically 45 minutes face-to-face)  [] RE-EVAL     [x] YM(65127) x  1  [] Dry needle 1 or 2 Muscles (56516)  [] NMR (28097) x     [] Dry needle 3+ Muscles (56329)  [x] Manual (89888) x   2    [] TA (81479) x     [] Mech Traction (95669)  [] ES(attended) (66918)     [] ES (un) (76243):   [] VASO (45902)  [] Other:    If BWC Please Indicate Time In/Out  CPT Code Time in Time out                                     GOALS:  Patient stated goal: improve AROM to allow safe driving  [x] Progressing: [] Met: [] Not Met: [] Adjusted    Therapist goals for Patient:   Short Term Goals: To be achieved in: 2 weeks  1. Independent in HEP and progression per patient tolerance, in order to prevent re-injury. [x] Progressing: [] Met: [] Not Met: [] Adjusted  2. Patient will have a decrease in pain to facilitate improvement in movement, function, and ADLs as indicated by Functional Deficits. [x] Progressing: [] Met: [] Not Met: [] Adjusted    Long Term Goals: To be achieved in: 4 weeks  1. Disability index score of 42 or better with [place] FS and less than 40% disability for NDI to assist with reaching prior level of function. [x] Progressing: [] Met: [] Not Met: [] Adjusted  2. Patient will demonstrate increased AROM to [facility] of cervical/thoracic spine to allow for proper joint functioning as indicated by patients Functional Deficits. [x] Progressing: [] Met: [] Not Met: [] Adjusted  3. Patient will demonstrate an increase in postural awareness and control and activation of  Deep cervical stabilizers to allow for proper functional mobility as indicated by patients Functional Deficits. [x] Progressing: [] Met: [] Not Met: [] Adjusted  4. Patient will return to lifting/reaching functional activities without increased symptoms or restriction. [x] Progressing: [] Met: [] Not Met: [] Adjusted  5.  Patient will report being able to sleep at least 6 hours and drive without increased symptoms or restriction. (patient specific functional goal)    [x] Progressing: [] Met: [] Not Met: [] Adjusted    ASSESSMENT: Patient continues with tightness along bilat UT and scalenes. Lessening reduction in cervical joint mobility. Improving neural extensibility, R UE less restriction than L. Worked further on periscapular activation and improved head posture with exercises. Updated HEP. Feeling improved at conclusion of session. Will plan to have patient follow up as needed. Treatment/Activity Tolerance:  [x] Patient tolerated treatment well [] Patient limited by fatique  [] Patient limited by pain  [] Patient limited by other medical complications  [] Other:     Overall Progression Towards Functional goals/ Treatment Progress Update:  [x] Patient is progressing as expected towards functional goals listed. [] Progression is slowed due to complexities/Impairments listed. [] Progression has been slowed due to co-morbidities. [] Plan just implemented, too soon to assess goals progression <30days   [] Goals require adjustment due to lack of progress  [] Patient is not progressing as expected and requires additional follow up with physician  [] Other    Prognosis for POC: [] Good [x] Fair  [] Poor    Patient requires continued skilled intervention: [x] Yes  [] No        PLAN:   [x] Continue per plan of care [] Alter current plan (see comments)  [] Plan of care initiated [] Hold pending MD visit [] Discharge    Electronically signed by: Shahab Juares PT    Note: If patient does not return for scheduled/recommended follow up visits, this note will serve as a discharge from care along with the most recent update on progress.

## 2023-05-22 ENCOUNTER — HOSPITAL ENCOUNTER (OUTPATIENT)
Dept: PHYSICAL THERAPY | Age: 63
Setting detail: THERAPIES SERIES
Discharge: HOME | End: 2023-05-22
Payer: COMMERCIAL

## 2023-05-22 PROCEDURE — 97161 PT EVAL LOW COMPLEX 20 MIN: CPT

## 2023-05-22 NOTE — FLOWSHEET NOTE
[place]  [address] [facility]  Phone: [phone] Fax: [phone]    Physical Therapy Treatment Note/ Progress Report:     Date:  2023    Patient Name:  Vitaliy Chang    :  1960  MRN: [id number]  Restrictions/Precautions:    Medical/Treatment Diagnosis Information:  Diagnosis: cervical pain  Treatment Diagnosis: cervical pain N09.1  Insurance/Certification information:  PT Insurance Information: [employer] [address] 150  Physician Information:  LUCIANA Heaton/ Dr Johanne Emmanuel of care signed (Y/N):     Date of Patient follow up with Physician:      Progress Report: [x]  Yes  []  No     Date Range for reporting period:  Beginnin2023  Ending:     Progress report due (10 Rx/or 30 days whichever is less):      Recertification due (POC duration/ or 90 days whichever is less): 2023     Visit # Insurance Allowable Auth Needed   1 [employer] (20/yr) [x]Yes    []No     Pain level:  3-610     SUBJECTIVE:  See eval    OBJECTIVE: See eval  Observation:   Test measurements:      RESTRICTIONS/PRECAUTIONS: c4/c5 fusion 2022 (current imaging doesn't show all the way healed yet-but no increased movement of fusion)    Exercises/Interventions:   Therapeutic Ex (18689)  Min: Sets/sec Reps Notes   UBE      T- band Row/pinch      T- band lower pinch      T- band ER activation      UT stretch      Levator stretch      Isometric at wall      Quadruped w cerv retract      Front plank      Side plank      Chin tuck      Chin tuck w lift      Chin tuck w rotation                  Manual Intervention (73332)  Min: 15      Cerv mobs/manip 1 10    Thoracic mobs/manip      CT manip      Rib mobilizations      STM 1 5    DN            NMR re-education (46939)  Min:      T-spine Ext- foam roll      Chin tucks       No money      Wall Postural re-ed            Therapeutic Activity (95170)  Min:                                        Therapeutic

## 2023-05-22 NOTE — PLAN OF CARE
-  [place]  Phone: [phone]   Fax:     [phone]        Physical Therapy Certification    Dear LUCIANA Hernandez/ Dr Stephani Montiel    We had the pleasure of evaluating the following patient for physical therapy services at [address] [place] [address]. A summary of our findings can be found in the initial assessment below. This includes our plan of care. If you have any questions or concerns regarding these findings, please do not hesitate to contact me at the office phone number checked above. Thank you for the referral.       Physician Signature:_______________________________Date:__________________  By signing above (or electronic signature), therapists plan is approved by physician      Patient: Palmer Guzman   : 1960   MRN: [id number]  Referring Physician: LUCIANA Hernandez/ Dr Stephani Montiel     Evaluation Date: 2023      Medical Diagnosis Information:  Diagnosis: cervical pain   Treatment Diagnosis: cervical pain M54.2                                         Insurance information: PT Insurance Information: BCBS    Precautions/ Contra-indications: c4/c5 fusion  Latex Allergy:  [x]NO      []YES  Preferred Language for Healthcare:   [x]English       []other:    C-SSRS Triggered by Intake questionnaire (Past 2 wk assessment ):   [x] No, Questionnaire did not trigger screening.   [] Yes, Patient intake triggered C-SSRS Screening      [] C-SSRS Screening completed  [] PCP notified via Epic     SUBJECTIVE: Patient stated complaint:Patient reports that she has continued to have neck pain since her ACDF last year. Notes that she had imaging done and showed that her fusion hasn't healed all the way yet. Reports that she had 2 trial injections that had helped her be pain free for about 3 hours each. She had ablation in 3/2023, which has helped some. Reports that she no longer has shoulder pain.  Still

## 2023-05-25 ENCOUNTER — HOSPITAL ENCOUNTER (OUTPATIENT)
Dept: PHYSICAL THERAPY | Age: 63
Setting detail: THERAPIES SERIES
Discharge: HOME | End: 2023-05-25
Payer: COMMERCIAL

## 2023-05-25 PROCEDURE — 97140 MANUAL THERAPY 1/> REGIONS: CPT

## 2023-05-25 NOTE — FLOWSHEET NOTE
[] (62995 or 85338) Provided verbal/tactile cueing for activities related to improving balance, coordination, kinesthetic sense, posture, motor skill, proprioception and motor activation to allow for proper function of cervical, scapular, scapulothoracic and UE control with self care, carrying, lifting, driving/computer work.      Home Exercise Program:    [x] (93763) Reviewed/Progressed HEP activities related to strengthening, flexibility, endurance, ROM of cervical, scapular, scapulothoracic and UE control with self care, reaching, carrying, lifting, house/yardwork, driving/computer work  [] (99033) Reviewed/Progressed HEP activities related to improving balance, coordination, kinesthetic sense, posture, motor skill, proprioception of cervical, scapular, scapulothoracic and UE control with self care, reaching, carrying, lifting, house/yardwork, driving/computer work      Manual Treatments:  PROM / STM / Oscillations-Mobs:  G-I, II, III, IV (PA's, Inf., Post.)  [x] (72816) Provided manual therapy to mobilize soft tissue/joints of cervical/CT, scapular GHJ and UE for the purpose of decreasing headache, modulating pain, promoting relaxation,  increasing ROM, reducing/eliminating soft tissue swelling/inflammation/restriction, improving soft tissue extensibility and allowing for proper ROM for normal function with self care, reaching, carrying, lifting, house/yardwork, driving/computer work    Modalities:      Charges:  Timed Code Treatment Minutes: 35   Total Treatment Minutes: 40     [] EVAL (LOW) 64407 (typically 20 minutes face-to-face)  [] EVAL (MOD) 81378 (typically 30 minutes face-to-face)  [] EVAL (HIGH) 88505 (typically 45 minutes face-to-face)  [] RE-EVAL     [] BN(37588) x     [] Dry needle 1 or 2 Muscles (81181)  [] NMR (58744) x     [] Dry needle 3+ Muscles (33608)  [x] Manual (74811) x   2    [] TA (01690) x     [] Mech Traction (48467)  [] ES(attended) (57381)     [] ES (un) (16789):   [] VASO

## 2023-05-30 ENCOUNTER — HOSPITAL ENCOUNTER (OUTPATIENT)
Dept: PHYSICAL THERAPY | Age: 63
Setting detail: THERAPIES SERIES
Discharge: HOME | End: 2023-05-30
Payer: COMMERCIAL

## 2023-05-30 PROCEDURE — 97110 THERAPEUTIC EXERCISES: CPT

## 2023-05-30 PROCEDURE — 97140 MANUAL THERAPY 1/> REGIONS: CPT

## 2023-05-30 NOTE — FLOWSHEET NOTE
Extensor mass, B UT   DN  10          NMR re-education (83274)  Min:      T-spine Ext- foam roll      Chin tucks       No money      Wall Postural re-ed            Therapeutic Activity (40318)  Min:                                          Spoke with   regarding the use of Dry Needling     Dry needling manual therapy: consisted on the placement of 8 needles in the following muscles:  bilateral extensor mass. A 50 mm needle was inserted, piston, rotated, and coned to produce intramuscular mobilization. These techniques were used to restore functional range of motion, reduce muscle spasm and induce healing in the corresponding musculature. (07697)  Clean Technique was utilized today while applying Dry needling treatment. The treatment sites where cleaned with 70% solution of  isopropyl alcohol . The PT washed their hands and utilized treatment gloves along with hand  prior to inserting the needles. All needles where removed and discarded in the appropriate sharps container. MD has given verbal and/or written approval for this treatment. Attended low frequency (1-20Hz) electrical stimulation was utilized in conjunction with Dry Needling:  the Estim was manipulated between all above needles for a period of 10 min. at 4 volts. The low frequency electrical stimulation was used to help reduce muscle spasm and help to interrupt /[place] the pain cycle. (27713)     Therapeutic Exercise and NMR EXR  [x] (30251) Provided verbal/tactile cueing for activities related to strengthening, flexibility, endurance, ROM  for improvements in cervical, postural, scapular, scapulothoracic and UE control with self care, reaching, carrying, lifting, house/yardwork, driving/computer work.     [x] (83723) Provided verbal/tactile cueing for activities related to improving balance, coordination, kinesthetic sense, posture, motor skill, proprioception  to assist with cervical, scapular, scapulothoracic and UE control

## 2023-06-01 ENCOUNTER — HOSPITAL ENCOUNTER (OUTPATIENT)
Dept: PHYSICAL THERAPY | Age: 63
Setting detail: THERAPIES SERIES
Discharge: HOME | End: 2023-06-01
Payer: COMMERCIAL

## 2023-06-01 PROCEDURE — 97110 THERAPEUTIC EXERCISES: CPT

## 2023-06-01 PROCEDURE — 97140 MANUAL THERAPY 1/> REGIONS: CPT

## 2023-06-01 NOTE — FLOWSHEET NOTE
[place] [address] [address]  Phone: [phone] Fax: [phone]    Physical Therapy Treatment Note/ Progress Report:     Date:  2023    Patient Name:  Yue Gruber    :  1960  MRN: [id number]  Restrictions/Precautions:    Medical/Treatment Diagnosis Information:  Diagnosis: cervical pain  Treatment Diagnosis: cervical pain U51.1  Insurance/Certification information:  PT Insurance Information: Aarti Vasques  Physician Information:  LUCIANA Mejia/ Dr Sara Shields of care signed (Y/N):     Date of Patient follow up with Physician:      Progress Report: [x]  Yes  []  No     Date Range for reporting period:  Beginnin2023  Ending:     Progress report due (10 Rx/or 30 days whichever is less):      Recertification due (POC duration/ or 90 days whichever is less): 2023     Visit # Insurance Allowable Auth Needed   4 (3 of 13) [employer] (20/yr) [x]Yes    []No     Pain level:  5/10     SUBJECTIVE:  Patient reports that she was really sore in forearms after DN last session. Once soreness subsided was feeling better and was able to pressure wash without any issue yesterday. Did still have pain in R hand with driving today. R side of neck is very tight and restricted feeling today as well.        OBJECTIVE: See eval  Observation:   Test measurements:      RESTRICTIONS/PRECAUTIONS: c4/c5 fusion 2022 (current imaging doesn't show all the way healed yet-but no increased movement of fusion)    Exercises/Interventions:   Therapeutic Ex (19512)  Min: 10 Sets/sec Reps Notes   UBE      Chin tuck 10 10    AROM with OP cervical rotation 1 10    Wrist extensor stretch 30 4    UT stretch      Levator stretch      Isometric at wall      Quadruped w cerv retract      Front plank      Side plank      Chin tuck      Chin tuck w lift      Chin tuck w rotation                  Manual Intervention (95861)  Min: 35      Cerv

## 2023-06-06 ENCOUNTER — HOSPITAL ENCOUNTER (OUTPATIENT)
Dept: PHYSICAL THERAPY | Age: 63
Setting detail: THERAPIES SERIES
Discharge: HOME | End: 2023-06-06
Payer: COMMERCIAL

## 2023-06-06 PROCEDURE — 97110 THERAPEUTIC EXERCISES: CPT

## 2023-06-06 PROCEDURE — 97140 MANUAL THERAPY 1/> REGIONS: CPT

## 2023-06-06 NOTE — FLOWSHEET NOTE
scapulothoracic and UE control with self care, reaching, carrying, lifting, house/yardwork, driving/computer work. [x] (22355) Provided verbal/tactile cueing for activities related to improving balance, coordination, kinesthetic sense, posture, motor skill, proprioception  to assist with cervical, scapular, scapulothoracic and UE control with self care, reaching, carrying, lifting, house/yardwork, driving/computer work. Therapeutic Activities:    [] (25548 or 35779) Provided verbal/tactile cueing for activities related to improving balance, coordination, kinesthetic sense, posture, motor skill, proprioception and motor activation to allow for proper function of cervical, scapular, scapulothoracic and UE control with self care, carrying, lifting, driving/computer work.      Home Exercise Program:    [x] (08996) Reviewed/Progressed HEP activities related to strengthening, flexibility, endurance, ROM of cervical, scapular, scapulothoracic and UE control with self care, reaching, carrying, lifting, house/yardwork, driving/computer work  [] (01812) Reviewed/Progressed HEP activities related to improving balance, coordination, kinesthetic sense, posture, motor skill, proprioception of cervical, scapular, scapulothoracic and UE control with self care, reaching, carrying, lifting, house/yardwork, driving/computer work      Manual Treatments:  PROM / STM / Oscillations-Mobs:  G-I, II, III, IV (PA's, Inf., Post.)  [x] (40700) Provided manual therapy to mobilize soft tissue/joints of cervical/CT, scapular GHJ and UE for the purpose of decreasing headache, modulating pain, promoting relaxation,  increasing ROM, reducing/eliminating soft tissue swelling/inflammation/restriction, improving soft tissue extensibility and allowing for proper ROM for normal function with self care, reaching, carrying, lifting, house/yardwork, driving/computer work    Modalities:      Charges:  Timed Code Treatment Minutes: 40   Total Treatment

## 2023-06-08 ENCOUNTER — HOSPITAL ENCOUNTER (OUTPATIENT)
Dept: PHYSICAL THERAPY | Age: 63
Setting detail: THERAPIES SERIES
Discharge: HOME | End: 2023-06-08
Payer: COMMERCIAL

## 2023-06-08 PROCEDURE — 97110 THERAPEUTIC EXERCISES: CPT

## 2023-06-08 PROCEDURE — 97140 MANUAL THERAPY 1/> REGIONS: CPT

## 2023-06-08 NOTE — FLOWSHEET NOTE
Isometric at wall      Quadruped w cerv retract      Front plank      Side plank      Chin tuck      Chin tuck w lift      Chin tuck w rotation                  Manual Intervention (90379)  Min: 30      Cerv mobs/manip 1 10    Thoracic mobs/manip  0    CT manip      Rib mobilizations      STM 1 10 B Extensor mass, B UT   DN  10          NMR re-education (29324)  Min:      T-spine Ext- foam roll      Chin tucks       No money      Wall Postural re-ed            Therapeutic Activity (47424)  Min:                                          Spoke with   regarding the use of Dry Needling     Dry needling manual therapy: consisted on the placement of 8 needles in the following muscles:  bilateral multifidus c6/7 and c7/t1, bilateral UT.  A 50-60 mm needle was inserted, piston, rotated, and coned to produce intramuscular mobilization. These techniques were used to restore functional range of motion, reduce muscle spasm and induce healing in the corresponding musculature. (36132)  Clean Technique was utilized today while applying Dry needling treatment. The treatment sites where cleaned with 70% solution of  isopropyl alcohol . The PT washed their hands and utilized treatment gloves along with hand  prior to inserting the needles. All needles where removed and discarded in the appropriate sharps container. MD has given verbal and/or written approval for this treatment. Attended low frequency (1-20Hz) electrical stimulation was utilized in conjunction with Dry Needling:  the Estim was manipulated between all above needles for a period of 10 min. at 4 -6volts. The low frequency electrical stimulation was used to help reduce muscle spasm and help to interrupt /[place] the pain cycle.  (96546)     Therapeutic Exercise and NMR EXR  [x] (87509) Provided verbal/tactile cueing for activities related to strengthening, flexibility, endurance, ROM  for improvements in cervical, postural, scapular,

## 2023-06-22 ENCOUNTER — HOSPITAL ENCOUNTER (OUTPATIENT)
Dept: PHYSICAL THERAPY | Age: 63
Setting detail: THERAPIES SERIES
Discharge: HOME | End: 2023-06-22
Payer: COMMERCIAL

## 2023-06-22 PROCEDURE — 97110 THERAPEUTIC EXERCISES: CPT

## 2023-06-22 PROCEDURE — 97140 MANUAL THERAPY 1/> REGIONS: CPT

## 2023-06-22 NOTE — PLAN OF CARE
[address]  Phone: [phone] Fax: [phone]        Physical Therapy Re-Certification Plan of Care/MD UPDATE      Dear Joan Nj/ Dr Jorge Ayala  ,    We had the pleasure of treating the following patient for physical therapy services at [address] [place] [address]. A summary of our findings can be found in the updated assessment below. This includes our plan of care. If you have any questions or concerns regarding these findings, please do not hesitate to contact me at the office phone number checked above.   Thank you for the referral.     Physician Signature:________________________________Date:__________________  By signing above (or electronic signature), therapists plan is approved by physician    Date Range Of Visits: 2023 thru 2023  Total Visits to Date: 7 + eval (8 total)  Overall Response to Treatment:   [x]Patient is responding well to treatment and improvement is noted with regards  to goals   []Patient should continue to improve in reasonable time if they continue HEP   []Patient has plateaued and is no longer responding to skilled PT intervention    []Patient is getting worse and would benefit from return to referring MD   []Patient unable to adhere to initial POC   []Other:       Physical Therapy Treatment Note/ Progress Report:     Date:  2023    Patient Name:  Brit Jnuior    :  1960  MRN: [id number]  Restrictions/Precautions:    Medical/Treatment Diagnosis Information:  Diagnosis: cervical pain  Treatment Diagnosis: cervical pain A21.4  Insurance/Certification information:  PT Insurance Information: [address]  Physician Information:  LUCIANA Fox/ Dr [facility] of care signed (Y/N):     Date of Patient follow up with Physician:      Progress Report: [x]  Yes  []  No     Date Range for reporting period:  Beginnin2023  Endin2023    Progress report

## 2023-06-28 ENCOUNTER — HOSPITAL ENCOUNTER (OUTPATIENT)
Dept: PHYSICAL THERAPY | Age: 63
Setting detail: THERAPIES SERIES
Discharge: HOME | End: 2023-06-28
Payer: COMMERCIAL

## 2023-06-28 PROCEDURE — 97110 THERAPEUTIC EXERCISES: CPT

## 2023-06-28 PROCEDURE — 97140 MANUAL THERAPY 1/> REGIONS: CPT

## 2023-07-06 ENCOUNTER — HOSPITAL ENCOUNTER (OUTPATIENT)
Dept: PHYSICAL THERAPY | Age: 63
Setting detail: THERAPIES SERIES
Discharge: HOME | End: 2023-07-06
Payer: COMMERCIAL

## 2023-07-06 PROCEDURE — 97110 THERAPEUTIC EXERCISES: CPT

## 2023-07-06 PROCEDURE — 97140 MANUAL THERAPY 1/> REGIONS: CPT

## 2023-07-06 NOTE — FLOWSHEET NOTE
[address], [address] [place] [address]  Phone: [phone] Fax: [phone]        Physical Therapy Treatment Note/ Progress Report:     Date:  2023    Patient Name:  Shaina Benson    :  1960  MRN: [id number]  Restrictions/Precautions:    Medical/Treatment Diagnosis Information:  Diagnosis: cervical pain  Treatment Diagnosis: cervical pain X24.0  Insurance/Certification information:  PT Insurance Information: [id number]  Physician Information:  LUCIANA Amin/ Dr Awa Quan of care signed (Y/N):     Date of Patient follow up with Physician:      Progress Report: [x]  Yes  []  No     Date Range for reporting period:  Beginnin2023  Endin2023    Progress report due (10 Rx/or 30 days whichever is less): 283     Recertification due (POC duration/ or 90 days whichever is less): 2023     Visit # Insurance Allowable Auth Needed   10 (9 of 13) BCBS/[employer] (20/yr) [x]Yes    []No     Pain level:  5/10     SUBJECTIVE:  Patient reports that she felt good after last session. Had to help move tree that fell over the weekend and had increased symptoms into her hands that kept her up all night after. Also states that she had to do some additional work with arms and this kept her up as well. Neck wasn't too sore or stiff after doing this. Will be seeing doc tomorrow for consult for TP injections. OBJECTIVE: bilateral UT tight and restricted today; L >R today. Also bilateral forearms with increased TP noted, L arm along extensor mass and R now more isolated to flexors.     Observation:   Test measurements:  NDI 34% disability as of 2023    CERV ROM       Cervical Flexion 40     Cervical Extension 30       Left Right   Cervical SB 21 25   Cervical rotation 40 35   Quadrant       Dorsal Glide        UE ROM Left Right   Shoulder Flex 160 165   Shoulder Abd 160 172   Shoulder ER full full   Shoulder IR full full

## 2023-07-13 ENCOUNTER — HOSPITAL ENCOUNTER (OUTPATIENT)
Dept: PHYSICAL THERAPY | Age: 63
Setting detail: THERAPIES SERIES
Discharge: HOME | End: 2023-07-13
Payer: COMMERCIAL

## 2023-07-13 PROCEDURE — 97110 THERAPEUTIC EXERCISES: CPT

## 2023-07-13 PROCEDURE — 97140 MANUAL THERAPY 1/> REGIONS: CPT

## 2023-07-13 NOTE — FLOWSHEET NOTE
flexors/extensors. Good improvement in all areas after STM and manual therapy to improve motion. Overall continues to improve slowly in regards to ROM and cervical musculature activation. Decreased amount of stretch for wrist flexors due to increased buzzing feeling in L arm. Will benefit from further skilled PT services to continue to address ROM, strength, NM control as well as pain and management of soft tissue restriction. Frequency reduced to 1x week at this time. Treatment/Activity Tolerance:  [x] Patient tolerated treatment well [] Patient limited by fatique  [] Patient limited by pain  [] Patient limited by other medical complications  [] Other:     Overall Progression Towards Functional goals/ Treatment Progress Update:  [x] Patient is progressing as expected towards functional goals listed. [] Progression is slowed due to complexities/Impairments listed. [] Progression has been slowed due to co-morbidities. [] Plan just implemented, too soon to assess goals progression <30days   [] Goals require adjustment due to lack of progress  [] Patient is not progressing as expected and requires additional follow up with physician  [] Other    Prognosis for POC: [x] Good [] Fair  [] Poor    Patient requires continued skilled intervention: [x] Yes  [] No        PLAN: Continue to address soft tissue limitations, joint mobility in c/spine and overall cervical strength and endurance. [x] Continue per plan of care [] Alter current plan (see comments)  [] Plan of care initiated [] Hold pending MD visit [] Discharge    Electronically signed by: Rhianna Linares PT    Note: If patient does not return for scheduled/recommended follow up visits, this note will serve as a discharge from care along with the most recent update on progress.

## 2023-07-20 ENCOUNTER — HOSPITAL ENCOUNTER (OUTPATIENT)
Dept: PHYSICAL THERAPY | Age: 63
Setting detail: THERAPIES SERIES
Discharge: HOME | End: 2023-07-20
Payer: COMMERCIAL

## 2023-07-20 PROCEDURE — 97140 MANUAL THERAPY 1/> REGIONS: CPT

## 2023-07-20 NOTE — FLOWSHEET NOTE
[address], [address] [place] [address]  Phone: [phone] Fax: [phone]        Physical Therapy Treatment Note/ Progress Report:     Date:  2023    Patient Name:  Rach Aquino    :  1960  MRN: [id number]  Restrictions/Precautions:    Medical/Treatment Diagnosis Information:  Diagnosis: cervical pain  Treatment Diagnosis: cervical pain F95.5  Insurance/Certification information:  PT Insurance Information: [id number] [employer]  Physician Information:  LUCIANA Tom/ Dr Vazquez [facility] of care signed (Y/N):     Date of Patient follow up with Physician:      Progress Report: [x]  Yes  []  No     Date Range for reporting period:  Beginnin2023  Endin2023    Progress report due (10 Rx/or 30 days whichever is less):      Recertification due (POC duration/ or 90 days whichever is less): 2023     Visit # Insurance Allowable Auth Needed   12 (11 of 13) BCBS/[employer]lon (20/yr) [x]Yes    []No     Pain level:  510     SUBJECTIVE:  Patient reports that she felt good for the past week. Hands/arms were really sore for a day or two after last session- but then once the soreness dissipated they felt much better. States that she has been having more hand pain . Will be getting TP injections and they will be proceeding with injections on . OBJECTIVE: bilateral UT tight and restricted today; L >R today. Also bilateral forearms with increased TP noted, L arm along extensor mass and R now more isolated to flexors.     Observation:   Test measurements:  NDI 34% disability as of 2023    CERV ROM       Cervical Flexion 40     Cervical Extension 30       Left Right   Cervical SB 21 25   Cervical rotation 40 35   Quadrant       Dorsal Glide        UE ROM Left Right   Shoulder Flex 160 165   Shoulder Abd 160 172   Shoulder ER full full   Shoulder IR full full   Elbow flex/ext       Wrist flex/ext/pro/sup       Finger

## 2023-07-31 ENCOUNTER — HOSPITAL ENCOUNTER (OUTPATIENT)
Dept: PHYSICAL THERAPY | Age: 63
Setting detail: THERAPIES SERIES
Discharge: HOME | End: 2023-07-31
Payer: COMMERCIAL

## 2023-07-31 PROCEDURE — 97140 MANUAL THERAPY 1/> REGIONS: CPT

## 2023-07-31 PROCEDURE — 97110 THERAPEUTIC EXERCISES: CPT
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
## 2023-08-09 ENCOUNTER — HOSPITAL ENCOUNTER (OUTPATIENT)
Dept: PHYSICAL THERAPY | Age: 63
Setting detail: THERAPIES SERIES
Discharge: HOME | End: 2023-08-09
Payer: COMMERCIAL

## 2023-08-09 PROCEDURE — 97140 MANUAL THERAPY 1/> REGIONS: CPT

## 2023-08-09 NOTE — PLAN OF CARE
Progress Update:  [x] Patient is progressing as expected towards functional goals listed. [] Progression is slowed due to complexities/Impairments listed. [] Progression has been slowed due to co-morbidities. [] Plan just implemented, too soon to assess goals progression <30days   [] Goals require adjustment due to lack of progress  [] Patient is not progressing as expected and requires additional follow up with physician  [] Other    Prognosis for POC: [x] Good [] Fair  [] Poor    Patient requires continued skilled intervention: [x] Yes  [] No        PLAN: Continue to address soft tissue limitations, joint mobility in c/spine and overall cervical strength and endurance, as well as addressing bilateral forearm pain. [x] Continue per plan of care [] Alter current plan (see comments)  [] Plan of care initiated [] Hold pending MD visit [] Discharge    Electronically signed by: Cameron Delgado PT    Note: If patient does not return for scheduled/recommended follow up visits, this note will serve as a discharge from care along with the most recent update on progress.

## 2023-08-16 ENCOUNTER — HOSPITAL ENCOUNTER (OUTPATIENT)
Dept: PHYSICAL THERAPY | Age: 63
Setting detail: THERAPIES SERIES
Discharge: HOME | End: 2023-08-16
Payer: COMMERCIAL

## 2023-08-16 PROCEDURE — 97140 MANUAL THERAPY 1/> REGIONS: CPT
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
## 2023-08-23 ENCOUNTER — APPOINTMENT (OUTPATIENT)
Dept: PHYSICAL THERAPY | Age: 63
End: 2023-08-23
Payer: COMMERCIAL

## 2024-11-05 ENCOUNTER — HOSPITAL ENCOUNTER (OUTPATIENT)
Dept: PHYSICAL THERAPY | Age: 64
Setting detail: THERAPIES SERIES
Discharge: HOME | End: 2024-11-05
Payer: COMMERCIAL

## 2024-11-05 PROCEDURE — 97161 PT EVAL LOW COMPLEX 20 MIN: CPT

## 2024-11-05 NOTE — PLAN OF CARE
Therex (07430)     EVAL:LOW (62327 - Typically 20 minutes face-to-face) 1    Neuromusc. Re-ed (17491)    Re-Eval (91506)     Manual (75878)    Estim Unattended (32152)     Ther. Act (35212)    Mech. Traction (43514)     Gait (57982)    Dry Needle 1-2 muscle (30391)     Aquatic Therex (89825)    Dry Needle 3+ muscle (20561)     Iontophoresis (40473)    VASO (86914)     Ultrasound (60465)    Group Therapy (38352)     Estim Attended (87417)    Canalith Repositioning (23672)     Physical Performance Test (24381)         Other:    Other:    Total Timed Code Tx Minutes    1     Total Treatment Minutes 35        Charge Justification:  (94149) THERAPEUTIC EXERCISE - Provided verbal/tactile cueing for activities related to strengthening, flexibility, endurance, ROM performed to prevent loss of range of motion, maintain or improve muscular strength or increase flexibility, following either an injury or surgery.   (05045) HOME EXERCISE PROGRAM - Reviewed/Progressed HEP activities related to strengthening, flexibility, endurance, ROM performed to prevent loss of range of motion, maintain or improve muscular strength or increase flexibility, following either an injury or surgery.  (57755) NEUROMUSCULAR RE-EDUCATION - Therapeutic procedure, 1 or more areas, each 15 minutes; neuromuscular reeducation of movement, balance, coordination, kinesthetic sense, posture, and/or proprioception for sitting and/or standing activities  (85378) MANUAL THERAPY -  Manual therapy techniques, 1 or more regions, each 15 minutes (Mobilization/manipulation, manual lymphatic drainage, manual traction) for the purpose of modulating pain, promoting relaxation,  increasing ROM, reducing/eliminating soft tissue swelling/inflammation/restriction, improving soft tissue extensibility and allowing for proper ROM for normal function with self care, mobility, lifting and ambulation    GOALS     Patient stated goal: reduce neck pain  [] Progressing: [] Met: []

## 2024-11-13 ENCOUNTER — HOSPITAL ENCOUNTER (OUTPATIENT)
Dept: PHYSICAL THERAPY | Age: 64
Setting detail: THERAPIES SERIES
Discharge: HOME | End: 2024-11-13
Payer: COMMERCIAL

## 2024-11-13 PROCEDURE — 97032 APPL MODALITY 1+ESTIM EA 15: CPT

## 2024-11-13 PROCEDURE — 97140 MANUAL THERAPY 1/> REGIONS: CPT

## 2024-11-13 PROCEDURE — 97110 THERAPEUTIC EXERCISES: CPT

## 2024-11-13 PROCEDURE — 20560 NDL INSJ W/O NJX 1 OR 2 MUSC: CPT

## 2024-11-13 NOTE — FLOWSHEET NOTE
(89470)    Dry Needle 1-2 muscle (02191) 1    Aquatic Therex (24487)    Dry Needle 3+ muscle (16204)     Iontophoresis (04385)    VASO (22405)     Ultrasound (81767)    Group Therapy (08281)     Estim Attended (96151) 10 1  Canalith Repositioning (93767)     Physical Performance Test (06913)         Other:    Other:    Total Timed Code Tx Minutes 44 1  1     Total Treatment Minutes 49        Charge Justification:  (53093) THERAPEUTIC EXERCISE - Provided verbal/tactile cueing for activities related to strengthening, flexibility, endurance, ROM performed to prevent loss of range of motion, maintain or improve muscular strength or increase flexibility, following either an injury or surgery.   (37653) HOME EXERCISE PROGRAM - Reviewed/Progressed HEP activities related to strengthening, flexibility, endurance, ROM performed to prevent loss of range of motion, maintain or improve muscular strength or increase flexibility, following either an injury or surgery.  (25492) NEUROMUSCULAR RE-EDUCATION - Therapeutic procedure, 1 or more areas, each 15 minutes; neuromuscular reeducation of movement, balance, coordination, kinesthetic sense, posture, and/or proprioception for sitting and/or standing activities  (55585) MANUAL THERAPY -  Manual therapy techniques, 1 or more regions, each 15 minutes (Mobilization/manipulation, manual lymphatic drainage, manual traction) for the purpose of modulating pain, promoting relaxation,  increasing ROM, reducing/eliminating soft tissue swelling/inflammation/restriction, improving soft tissue extensibility and allowing for proper ROM for normal function with self care, mobility, lifting and ambulation    GOALS     Patient stated goal: reduce neck pain  [] Progressing: [] Met: [] Not Met: [] Adjusted    Therapist goals for Patient:   Short Term Goals: To be achieved in: 2 weeks  1. Independent in HEP and progression per patient tolerance, in order to prevent re-injury.   [] Progressing: [] Met:

## 2024-11-15 ENCOUNTER — HOSPITAL ENCOUNTER (OUTPATIENT)
Dept: PHYSICAL THERAPY | Age: 64
Setting detail: THERAPIES SERIES
Discharge: HOME | End: 2024-11-15
Payer: COMMERCIAL

## 2024-11-15 PROCEDURE — 97140 MANUAL THERAPY 1/> REGIONS: CPT

## 2024-11-15 PROCEDURE — 97110 THERAPEUTIC EXERCISES: CPT

## 2024-11-15 NOTE — FLOWSHEET NOTE
[facility] - Outpatient Rehabilitation and Therapy [address] [place] [address] office: [phone] fax: [phone]       Physical Therapy: TREATMENT/PROGRESS NOTE   Patient: Renee Durán (64 y.o. female)   Examination Date: 11/15/2024   :  1960 MRN: [id number]   Visit #: 3 (# 2 of 7 thru 1/3/2025)  Insurance Allowable Auth Needed   BCBS, 20/yr [x]Yes    []No    Insurance: Payor: BCBS / Plan: BCBS - [place] PPO / Product Type: *No Product type* /   Insurance ID: [id number] - ([place] BCBS)  Secondary Insurance (if applicable):    Treatment Diagnosis: neck pain M54.2, R shoulder pain M25.511  No diagnosis found.   Medical Diagnosis: neck pain M54.2, R shoulder pain M25.511  No admission diagnoses are documented for this encounter.   Referring Physician: Jacinda Gibbs MD  PCP: No primary care provider on file.     Plan of care signed (Y/N):     Date of Patient follow up with Physician:      Plan of Care Report: NO  POC update due: (10 visits /OR AUTH LIMITS, whichever is less)  2024                                             Medical History:  Comorbidities:  Diabetes (Type I or II)  Hypertension  Relevant Medical History: prior C5/6 ACDF in                                          Precautions/ Contra-indications:           Latex allergy:  NO  Pacemaker:    NO  Contraindications for Manipulation: None  Date of Surgery: n/a  Other:    Red Flags:  None    Suicide Screening:   The patient did not verbalize a primary behavioral concern, suicidal ideation, suicidal intent, or demonstrate suicidal behaviors.    Preferred Language for Healthcare:   [x] English       [] other:    SUBJECTIVE EXAMINATION     Patient stated complaint: Reports that the DN was helpful. Not near as tight in her UT's. Having less N/T into her 4th and 5th digit- only feeling when she is sleeping now. Still can't drive. Has been working on decreasing her activation of her traps.          Test used Initial

## 2024-11-18 ENCOUNTER — HOSPITAL ENCOUNTER (OUTPATIENT)
Dept: PHYSICAL THERAPY | Age: 64
Setting detail: THERAPIES SERIES
End: 2024-11-18
Payer: COMMERCIAL

## 2024-11-22 ENCOUNTER — HOSPITAL ENCOUNTER (OUTPATIENT)
Dept: PHYSICAL THERAPY | Age: 64
Setting detail: THERAPIES SERIES
Discharge: HOME | End: 2024-11-22
Payer: COMMERCIAL

## 2024-11-22 PROCEDURE — 97110 THERAPEUTIC EXERCISES: CPT

## 2024-11-22 PROCEDURE — 97140 MANUAL THERAPY 1/> REGIONS: CPT

## 2024-11-22 NOTE — FLOWSHEET NOTE
[facility] - Outpatient Rehabilitation and Therapy [address] [place] [address] office: [phone] fax: [phone]       Physical Therapy: TREATMENT/PROGRESS NOTE   Patient: Renee Durán (64 y.o. female)   Examination Date: 2024   :  1960 MRN: [id number]   Visit #: 4 (# 3 of 7 thru 1/3/2025)  Insurance Allowable Auth Needed   BCBS, 20/yr [x]Yes    []No    Insurance: Payor: [place] BCBS / Plan: [place]BS [place] / Product Type: *No Product type* /   Insurance ID: [id number] - ([place] BCBS)  Secondary Insurance (if applicable): BCBS   Treatment Diagnosis: neck pain M54.2, R shoulder pain M25.511  No diagnosis found.   Medical Diagnosis: neck pain M54.2, R shoulder pain M25.511  No admission diagnoses are documented for this encounter.   Referring Physician: Jacinda Gibbs MD  PCP: No primary care provider on file.     Plan of care signed (Y/N):     Date of Patient follow up with Physician:      Plan of Care Report: NO  POC update due: (10 visits /OR AUTH LIMITS, whichever is less)  2024                                             Medical History:  Comorbidities:  Diabetes (Type I or II)  Hypertension  Relevant Medical History: prior C5/6 ACDF in                                          Precautions/ Contra-indications:           Latex allergy:  NO  Pacemaker:    NO  Contraindications for Manipulation: None  Date of Surgery: n/a  Other:    Red Flags:  None    Suicide Screening:   The patient did not verbalize a primary behavioral concern, suicidal ideation, suicidal intent, or demonstrate suicidal behaviors.    Preferred Language for Healthcare:   [x] English       [] other:    SUBJECTIVE EXAMINATION     Patient stated complaint: Reports that she felt ok after last session. Still having tightness in neck and feeling sore in the back of her R shoulder as well. Did call and get TP injection scheduled for .           Test used Initial score  2024   Pain

## 2024-11-25 ENCOUNTER — HOSPITAL ENCOUNTER (OUTPATIENT)
Dept: PHYSICAL THERAPY | Age: 64
Setting detail: THERAPIES SERIES
Discharge: HOME | End: 2024-11-25
Payer: COMMERCIAL

## 2024-11-25 PROCEDURE — 97110 THERAPEUTIC EXERCISES: CPT

## 2024-11-25 PROCEDURE — 97140 MANUAL THERAPY 1/> REGIONS: CPT

## 2024-11-25 NOTE — FLOWSHEET NOTE
[facility] - Outpatient Rehabilitation and Therapy [address] [place] [address] office: [phone] fax: [phone]       Physical Therapy: TREATMENT/PROGRESS NOTE   Patient: Renee Durán (64 y.o. female)   Examination Date: 2024   :  1960 MRN: [id number]   Visit #: 5 (# 4 of 7 thru 1/3/2025)  Insurance Allowable Auth Needed   BCBS, 20/yr [x]Yes    []No    Insurance: Payor: [place] BCBS / Plan: [place] / Product Type: *No Product type* /   Insurance ID: [id number] - ([place] BCBS)  Secondary Insurance (if applicable): BCBS   Treatment Diagnosis: neck pain M54.2, R shoulder pain M25.511  No diagnosis found.   Medical Diagnosis: neck pain M54.2, R shoulder pain M25.511  No admission diagnoses are documented for this encounter.   Referring Physician: Jacinda Gibbs MD  PCP: No primary care provider on file.     Plan of care signed (Y/N):     Date of Patient follow up with Physician:      Plan of Care Report: NO  POC update due: (10 visits /OR AUTH LIMITS, whichever is less)  2024                                             Medical History:  Comorbidities:  Diabetes (Type I or II)  Hypertension  Relevant Medical History: prior C5/6 ACDF in                                          Precautions/ Contra-indications:           Latex allergy:  NO  Pacemaker:    NO  Contraindications for Manipulation: None  Date of Surgery: n/a  Other:    Red Flags:  None    Suicide Screening:   The patient did not verbalize a primary behavioral concern, suicidal ideation, suicidal intent, or demonstrate suicidal behaviors.    Preferred Language for Healthcare:   [x] English       [] other:    SUBJECTIVE EXAMINATION     Patient stated complaint: Reports that she was really sore in her neck and shoulder after last session. Had a pop in the other shoulder, but feels fine today.            Test used Initial score  2024   Pain Summary VAS 3-6/10    Functional questionnaire Neck

## 2024-12-03 ENCOUNTER — HOSPITAL ENCOUNTER (OUTPATIENT)
Dept: PHYSICAL THERAPY | Age: 64
Setting detail: THERAPIES SERIES
Discharge: HOME | End: 2024-12-03
Payer: COMMERCIAL

## 2024-12-03 PROCEDURE — 97140 MANUAL THERAPY 1/> REGIONS: CPT

## 2024-12-03 PROCEDURE — 97110 THERAPEUTIC EXERCISES: CPT

## 2024-12-03 NOTE — FLOWSHEET NOTE
VAS 3-6/10    Functional questionnaire Neck Disability Index 44%    Other:                OBJECTIVE EXAMINATION     11/5/24  ROM/Strength: (Blank cells denote NT)    Mvmt (norm) AROM L AROM R Notes PROM L PROM R Notes     CERVICAL Flex (60) 24       Ext (70) 10       SB(45) 23 25        Rotation (80) 40 25           SHOULDER Flexion (180) 150 145        Abduction (180) 150 135        ER -0 65 72        ER -90 (90)          IR -0 T10/11 L2/3        IR -90 (70)                         MMT L MMT R Notes     CERVICAL Cerv flexion       Cerv extension       Cerv SB       Cerv rotation          SHOULDER Flexion 16.1 14.0 c pn     Abduction 16.6 9.7 c pn     ER -0 23.3 19.4      ER -90       IR -0 22.9 21.1 c pn     IR -90          Palpation:   Patient reported tenderness with palpation  Location:R cervical spine, R UT, R posterior shoulder- RTC insertion    Posture:   rounded shoulders    Bandages/Dressings/Incisions:  Not Applicable    Dermatomes: Abnormal findings listed below  None, all WNL    Myotomes: Abnormal findings listed below  None, all WNL    Reflexes: Abnormal findings listed below  All reflexes WNL (2+)    Specific Joint Mobility Testing/Accessory Motions:      Cervical: hypomobility of R side C2 C3 C4    Glenohumeral joint: hypomobile on R    Gait:    Pattern: WNL  Assistive Device Used: no AD    Special Tests:  [] None Assessed   [] Following tests noted:    NT    Balance:  [x] WNL      [] NT       [] Dysfunction noted  Comment:     Falls Risk Assessment (30 days):   Falls Risk assessed and no intervention required.  Time Up and Go (TUG):   Not Assessed        Exercises/Interventions     Therapeutic Ex (99101) 13 resistance Sets/time Reps Notes/Cues/Progressions   Sleeper stretch  30 3           Standing cervical retraction at wall + no money Red band 5 15    Prone scapular retraction + chin tuck lift  0     Standing rows with chin tuck green band 3sec 15    Standing sh extension with chin tuck Red

## 2024-12-05 ENCOUNTER — APPOINTMENT (OUTPATIENT)
Dept: PHYSICAL THERAPY | Age: 64
End: 2024-12-05
Payer: COMMERCIAL

## 2024-12-11 ENCOUNTER — HOSPITAL ENCOUNTER (OUTPATIENT)
Dept: PHYSICAL THERAPY | Age: 64
Setting detail: THERAPIES SERIES
Discharge: HOME | End: 2024-12-11
Payer: COMMERCIAL

## 2024-12-11 PROCEDURE — 97110 THERAPEUTIC EXERCISES: CPT

## 2024-12-11 PROCEDURE — 97140 MANUAL THERAPY 1/> REGIONS: CPT

## 2024-12-11 NOTE — FLOWSHEET NOTE
[facility] - Outpatient Rehabilitation and Therapy [address] [place] [id number] office: [phone] fax: [phone]       Physical Therapy: TREATMENT/PROGRESS NOTE   Patient: Renee Durán (64 y.o. female)   Examination Date: 2024   :  1960 MRN: [id number]   Visit #: 7 (# 6 of 7 thru 1/3/2025)  Insurance Allowable Auth Needed   BCBS, 20/yr [x]Yes    []No    Insurance: Pay[place] / Plan: [place] / Product Type: *No Product type* /   Insurance ID: [id number] - ([employer])  Secondary Insurance (if applicable):    Treatment Diagnosis: neck pain M54.2, R shoulder pain M25.511  No diagnosis found.   Medical Diagnosis: neck pain M54.2, R shoulder pain M25.511  No admission diagnoses are documented for this encounter.   Referring Physician: Jacinda Gibbs MD  PCP: No primary care provider on file.     Plan of care signed (Y/N):     Date of Patient follow up with Physician:      Plan of Care Report: NO  POC update due: (10 visits /OR AUTH LIMITS, whichever is less)  1/10/2025                                             Medical History:  Comorbidities:  Diabetes (Type I or II)  Hypertension  Relevant Medical History: prior C5/6 ACDF in                                          Precautions/ Contra-indications:           Latex allergy:  NO  Pacemaker:    NO  Contraindications for Manipulation: None  Date of Surgery: n/a  Other:    Red Flags:  None    Suicide Screening:   The patient did not verbalize a primary behavioral concern, suicidal ideation, suicidal intent, or demonstrate suicidal behaviors.    Preferred Language for Healthcare:   [x] English       [] other:    SUBJECTIVE EXAMINATION     Patient stated complaint: Reports that she wasn't too sore after last session and felt pretty good. Neck got really stiff over the past week and was really sore and uncomfortable. Still feeling some discomfort along outside of shoulder now that neck is feeling more stiff.  Kindly change Humulin R to Novolin R because it is non formulary the Humulin R.    Pt using 20 units TID.    Rx will be send to [facility]    Thank you  Leah

## 2024-12-18 ENCOUNTER — HOSPITAL ENCOUNTER (OUTPATIENT)
Dept: PHYSICAL THERAPY | Age: 64
Setting detail: THERAPIES SERIES
Discharge: HOME | End: 2024-12-18
Payer: COMMERCIAL

## 2024-12-18 PROCEDURE — 97140 MANUAL THERAPY 1/> REGIONS: CPT

## 2024-12-18 NOTE — PLAN OF CARE
face-to-face)     Neuromusc. Re-ed (28850)    Re-Eval (24306)     Manual (48417) 39 3  Estim Unattended (23652)     Ther. Act (41257)    Mech. Traction (47334)     Gait (09563)    Dry Needle 1-2 muscle (44221)     Aquatic Therex (63128)    Dry Needle 3+ muscle (20561)     Iontophoresis (74958)    VASO (47060)     Ultrasound (99401)    Group Therapy (22689)     Estim Attended (28887)    Canalith Repositioning (73019)     Physical Performance Test (24975)         Other:    Other:    Total Timed Code Tx Minutes 39 3       Total Treatment Minutes 39        Charge Justification:  (13971) THERAPEUTIC EXERCISE - Provided verbal/tactile cueing for activities related to strengthening, flexibility, endurance, ROM performed to prevent loss of range of motion, maintain or improve muscular strength or increase flexibility, following either an injury or surgery.   (37398) HOME EXERCISE PROGRAM - Reviewed/Progressed HEP activities related to strengthening, flexibility, endurance, ROM performed to prevent loss of range of motion, maintain or improve muscular strength or increase flexibility, following either an injury or surgery.  (50425) NEUROMUSCULAR RE-EDUCATION - Therapeutic procedure, 1 or more areas, each 15 minutes; neuromuscular reeducation of movement, balance, coordination, kinesthetic sense, posture, and/or proprioception for sitting and/or standing activities  (63151) MANUAL THERAPY -  Manual therapy techniques, 1 or more regions, each 15 minutes (Mobilization/manipulation, manual lymphatic drainage, manual traction) for the purpose of modulating pain, promoting relaxation,  increasing ROM, reducing/eliminating soft tissue swelling/inflammation/restriction, improving soft tissue extensibility and allowing for proper ROM for normal function with self care, mobility, lifting and ambulation    GOALS     Patient stated goal: reduce neck pain  [x] Progressing: [] Met: [] Not Met: [] Adjusted    Therapist goals for Patient:

## 2024-12-31 ENCOUNTER — APPOINTMENT (OUTPATIENT)
Dept: PHYSICAL THERAPY | Age: 64
End: 2024-12-31
Payer: COMMERCIAL

## 2025-01-17 ENCOUNTER — HOSPITAL ENCOUNTER (OUTPATIENT)
Dept: PHYSICAL THERAPY | Age: 65
Setting detail: THERAPIES SERIES
Discharge: HOME | End: 2025-01-17
Payer: COMMERCIAL

## 2025-01-17 PROCEDURE — 97110 THERAPEUTIC EXERCISES: CPT

## 2025-01-17 PROCEDURE — 97140 MANUAL THERAPY 1/> REGIONS: CPT

## 2025-01-17 NOTE — FLOWSHEET NOTE
(UNTIMED) #     Therex (56564)  8 1  EVAL:LOW (43841 - Typically 20 minutes face-to-face)     Neuromusc. Re-ed (69497)    Re-Eval (45631)     Manual (28149) 33 2  Estim Unattended (60840)     Ther. Act (47202)    Mech. Traction (85405)     Gait (30873)    Dry Needle 1-2 muscle (92305)     Aquatic Therex (12743)    Dry Needle 3+ muscle (20561)     Iontophoresis (81707)    VASO (80810)     Ultrasound (44407)    Group Therapy (25164)     Estim Attended (61796)    Canalith Repositioning (62593)     Physical Performance Test (95166)         Other:    Other:    Total Timed Code Tx Minutes 41 3       Total Treatment Minutes 41        Charge Justification:  (27467) THERAPEUTIC EXERCISE - Provided verbal/tactile cueing for activities related to strengthening, flexibility, endurance, ROM performed to prevent loss of range of motion, maintain or improve muscular strength or increase flexibility, following either an injury or surgery.   (90971) HOME EXERCISE PROGRAM - Reviewed/Progressed HEP activities related to strengthening, flexibility, endurance, ROM performed to prevent loss of range of motion, maintain or improve muscular strength or increase flexibility, following either an injury or surgery.  (49706) NEUROMUSCULAR RE-EDUCATION - Therapeutic procedure, 1 or more areas, each 15 minutes; neuromuscular reeducation of movement, balance, coordination, kinesthetic sense, posture, and/or proprioception for sitting and/or standing activities  (58977) MANUAL THERAPY -  Manual therapy techniques, 1 or more regions, each 15 minutes (Mobilization/manipulation, manual lymphatic drainage, manual traction) for the purpose of modulating pain, promoting relaxation,  increasing ROM, reducing/eliminating soft tissue swelling/inflammation/restriction, improving soft tissue extensibility and allowing for proper ROM for normal function with self care, mobility, lifting and ambulation    GOALS     Patient stated goal: reduce neck pain  [x]

## 2025-01-20 ENCOUNTER — HOSPITAL ENCOUNTER (OUTPATIENT)
Dept: PHYSICAL THERAPY | Age: 65
Setting detail: THERAPIES SERIES
Discharge: HOME | End: 2025-01-20
Payer: COMMERCIAL

## 2025-01-20 PROCEDURE — 97140 MANUAL THERAPY 1/> REGIONS: CPT

## 2025-01-20 PROCEDURE — 97110 THERAPEUTIC EXERCISES: CPT

## 2025-01-20 NOTE — FLOWSHEET NOTE
[facility] - Outpatient Rehabilitation and Therapy [address] [place] [address] office: [phone] fax: [phone]          Physical Therapy: TREATMENT/PROGRESS NOTE   Patient: Reene Durán (64 y.o. female)   Examination Date: 2025   :  1960 MRN: [id number]   Visit #: 10   (# 2 of 5 2024 thru 2025)  (# 7 of 7 thru 1/3/2025)  Insurance Allowable Auth Needed   BCBS, 20/yr [x]Yes    []No    Insurance: [place] / [place] / Product Type: *No Product type* /   Insurance ID: [id number] - ([employer])  Secondary Insurance (if applicable):    Treatment Diagnosis: neck pain M54.2, R shoulder pain M25.511  No diagnosis found.   Medical Diagnosis: neck pain M54.2, R shoulder pain M25.511  No admission diagnoses are documented for this encounter.   Referring Physician: Jacinda Gibbs MD  PCP: No primary care provider on file.     Plan of care signed (Y/N):     Date of Patient follow up with Physician:      Plan of Care Report: YES, Date Range for this report: 2024 to 2024  POC update due: (10 visits /OR AUTH LIMITS, whichever is less)  2025                                             Medical History:  Comorbidities:  Diabetes (Type I or II)  Hypertension  Relevant Medical History: prior C5/6 ACDF in                                          Precautions/ Contra-indications:           Latex allergy:  NO  Pacemaker:    NO  Contraindications for Manipulation: None  Date of Surgery: n/a  Other:    Red Flags:  None    Suicide Screening:   The patient did not verbalize a primary behavioral concern, suicidal ideation, suicidal intent, or demonstrate suicidal behaviors.    Preferred Language for Healthcare:   [x] English       [] other:    SUBJECTIVE EXAMINATION     Patient stated complaint: Reports that she felt ok after last session. Had some N/T into her 4th and 5th finger on her L hand with sleeping last night that woke her up. Dissipated

## 2025-02-04 ENCOUNTER — HOSPITAL ENCOUNTER (OUTPATIENT)
Dept: PHYSICAL THERAPY | Age: 65
Setting detail: THERAPIES SERIES
Discharge: HOME | End: 2025-02-04
Payer: COMMERCIAL

## 2025-02-04 PROCEDURE — 97140 MANUAL THERAPY 1/> REGIONS: CPT

## 2025-02-04 PROCEDURE — 97110 THERAPEUTIC EXERCISES: CPT

## 2025-02-04 NOTE — FLOWSHEET NOTE
extensibility.Patient will continue to benefit from ongoing evaluation and advanced clinical decision from a Physical Therapist to address and improve muscle strength and neuromuscular control to safely return to PLOF without symptoms or restrictions. Patient still with TP noted in bilateral UT. Good improvement with STM to area and manual stretching. Focused session on continued UT stretch/strengthening, as well as improving pec extensibility to allow for improved periscapular activation. Less discomfort noted at conclusion. Will benefit from further skilled PT services to address noted deficits and work toward further improvement in overall ROM and strength.      Medical Necessity Documentation:  I certify that this patient meets the below criteria necessary for medical necessity for care and/or justification of therapy services:  The patient has functional impairments and/or activity limitations and would benefit from continued outpatient therapy services to address the deficits outlined in the patients goals  The patient has a musculoskeletal condition(s) with a corresponding ICD-10 code that is of complexity and severity that require skilled therapeutic intervention. This has a direct and significant impact on the need for therapy and significantly impacts the rate of recovery.   The patient has generalized musculoskeletal conditions or a condition affecting multiple sites that will have a direct impact on the rate of recovery    Return to Play: NA    Prognosis for POC: [x] Good [] Fair  [] Poor    Patient requires continued skilled intervention: [x] Yes  [] No      CHARGE CAPTURE     PT CHARGE GRID   CPT Code (TIMED) minutes # CPT Code (UNTIMED) #     Therex (33300)  18 1  EVAL:LOW (81391 - Typically 20 minutes face-to-face)     Neuromusc. Re-ed (43273)    Re-Eval (74792)     Manual (77801) 27 2  Estim Unattended (73543)     Ther. Act (97683)    [facility] (78573)     Gait (47826)    Dry Needle 1-2 muscle

## 2025-02-11 ENCOUNTER — HOSPITAL ENCOUNTER (OUTPATIENT)
Dept: PHYSICAL THERAPY | Age: 65
Setting detail: THERAPIES SERIES
Discharge: HOME | End: 2025-02-11
Payer: COMMERCIAL

## 2025-02-11 PROCEDURE — 97110 THERAPEUTIC EXERCISES: CPT

## 2025-02-11 PROCEDURE — 97140 MANUAL THERAPY 1/> REGIONS: CPT

## 2025-02-11 NOTE — FLOWSHEET NOTE
Functional goals/ Treatment Progress Update:  [] Patient is progressing as expected towards functional goals listed.    [] Progression is slowed due to complexities/Impairments listed.  [] Progression has been slowed due to co-morbidities.  [x] Plan just implemented, too soon (<30days) to assess goals progression   [] Goals require adjustment due to lack of progress  [] Patient is not progressing as expected and requires additional follow up with physician  [] Other:     TREATMENT PLAN     Frequency/Duration: 2x/week for 8 weeks for the following treatment interventions:    Interventions:  Therapeutic Exercise (97285) including: strength training, ROM, and functional mobility  Therapeutic Activities (62144) including: functional mobility training and education.  Neuromuscular Re-education (46838) activation and proprioception, including postural re-education.    Manual Therapy (78417) as indicated to include: Passive Range of Motion, Gr I-IV mobilizations, Grade V Manipulation, Soft Tissue Mobilization, and Dry Needling/IASTM  Modalities as needed that may include: NONE  Patient education on joint protection, postural re-education, and progression of HEP    Plan: Cont POC- Continue emphasis/focus on exercise progression, improving proper muscle recruitment and activation/motor control patterns, increasing ROM, and improving soft tissue extensibility. Next visit plan to add new exercises and continue current phase     Electronically Signed by Jennifer Barfield PT  Date: 02/11/2025     Note: Portions of this note have been templated and/or copied from initial evaluation, reassessments and prior notes for documentation efficiency.    Note: If patient does not return for scheduled/recommended follow up visits, this note will serve as a discharge from care along with the most recent update on progress.

## 2025-02-19 ENCOUNTER — HOSPITAL ENCOUNTER (OUTPATIENT)
Dept: PHYSICAL THERAPY | Age: 65
Setting detail: THERAPIES SERIES
Discharge: HOME | End: 2025-02-19
Payer: COMMERCIAL

## 2025-02-19 PROCEDURE — 97140 MANUAL THERAPY 1/> REGIONS: CPT

## 2025-02-19 NOTE — PLAN OF CARE
visit    Electronically Signed by Jennifer Barfield, PT  Date: 02/19/2025     Note: Portions of this note have been templated and/or copied from initial evaluation, reassessments and prior notes for documentation efficiency.    Note: If patient does not return for scheduled/recommended follow up visits, this note will serve as a discharge from care along with the most recent update on progress.